# Patient Record
Sex: FEMALE | Race: WHITE
[De-identification: names, ages, dates, MRNs, and addresses within clinical notes are randomized per-mention and may not be internally consistent; named-entity substitution may affect disease eponyms.]

---

## 2017-01-02 ENCOUNTER — HOSPITAL ENCOUNTER (EMERGENCY)
Dept: HOSPITAL 17 - PHEFT | Age: 27
Discharge: HOME | End: 2017-01-02
Payer: MEDICARE

## 2017-01-02 VITALS
SYSTOLIC BLOOD PRESSURE: 133 MMHG | OXYGEN SATURATION: 99 % | RESPIRATION RATE: 18 BRPM | DIASTOLIC BLOOD PRESSURE: 79 MMHG | HEART RATE: 84 BPM

## 2017-01-02 VITALS
DIASTOLIC BLOOD PRESSURE: 105 MMHG | TEMPERATURE: 98.6 F | RESPIRATION RATE: 20 BRPM | HEART RATE: 93 BPM | SYSTOLIC BLOOD PRESSURE: 145 MMHG

## 2017-01-02 VITALS — BODY MASS INDEX: 47.09 KG/M2 | HEIGHT: 66 IN | WEIGHT: 293 LBS

## 2017-01-02 DIAGNOSIS — S23.3XXA: Primary | ICD-10-CM
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
## 2017-01-02 PROCEDURE — 72072 X-RAY EXAM THORAC SPINE 3VWS: CPT

## 2017-01-02 PROCEDURE — 99283 EMERGENCY DEPT VISIT LOW MDM: CPT

## 2017-01-02 PROCEDURE — 84703 CHORIONIC GONADOTROPIN ASSAY: CPT

## 2017-01-02 PROCEDURE — 96372 THER/PROPH/DIAG INJ SC/IM: CPT
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
## 2017-01-02 NOTE — RADHPO
EXAM DATE/TIME:  01/02/2017 14:05 

 

[facility] COMPARISON:     

No previous studies available for comparison.

 

                     

INDICATIONS :     

Upper back pain .

                     

 

MEDICAL HISTORY :     

None.          

 

SURGICAL HISTORY :     

None.   

 

ENCOUNTER:     

Initial                                        

 

ACUITY:     

3 days      

 

PAIN SCORE:     

10/10

 

LOCATION:      

middle thoracic spine

 

FINDINGS:     

There is normal alignment of the thoracic vertebral bodies.  Vertebral body height is maintained.  No
 evidence of fracture or subluxation.  Pedicles are intact at all levels.  The paravertebral reflecti
ons are not thickened. 

 

CONCLUSION:     No acute disease.  

 

 

 

 Cassius Tavares MD on January 02, 2017 at 14:33           

Board Certified Radiologist.

 This report was verified electronically.

## 2017-02-17 ENCOUNTER — HOSPITAL ENCOUNTER (OUTPATIENT)
Dept: HOSPITAL 17 - HRAD | Age: 27
End: 2017-02-17
Payer: MEDICARE

## 2017-02-17 DIAGNOSIS — N13.70: Primary | ICD-10-CM

## 2017-02-17 PROCEDURE — A9541 TC99M SULFUR COLLOID: HCPCS

## 2017-02-17 PROCEDURE — 78740 URETERAL REFLUX STUDY: CPT

## 2017-02-17 NOTE — RADRPT
EXAM DATE/TIME:  02/17/2017 14:07 

 

[facility] COMPARISON:     

No previous studies available for comparison.

 

INDICATIONS :      

***History of right side ureteral reflux and nephrolithiasis.

                       

 

DOSE:      

1.0 mCi Tc99m Sulfur Colloid via urinary catheter  

                       

 

VOLUME:       

A total of 750 cc of saline was instilled into the bladder before onset of voiding.

                       

                       

 

MEDICAL HISTORY :     

Crohn's disease.   Hypothyroid

 

SURGICAL HISTORY :      

Appendectomy.  Cholecystectomy.  Lithiotripsy.

 

ENCOUNTER:     

Initial

 

ACUITY:     

1 day

 

PAIN SCALE:     

6/10

 

LOCATION:      

Right lower quadrant 

 

TECHNIQUE:     

The urinary bladder was catheterized. Radiotracer was placed into the bladder through the catheter.  
Dynamic continuous rapid frame rate acquisition was performed during instillation of saline into the 
bladder and during voiding.  A static posterior planar image of the kidneys was performed postvoid.

 

FINDINGS:     

 

REFLUX:     

No episodes of vesicoureteral reflux are seen on either side during filling or emptying of the bladde
r.

 

POST VOID:     

No activity is seen in the region of the kidneys on the postvoid image.

 

CONCLUSION:     

1. Negative examination.

 

 

 

 Pk Roe MD on February 17, 2017 at 15:15           

Board Certified Radiologist.

 This report was verified electronically.

## 2017-04-23 ENCOUNTER — HOSPITAL ENCOUNTER (EMERGENCY)
Dept: HOSPITAL 17 - PHED | Age: 27
LOS: 1 days | Discharge: HOME | End: 2017-04-24
Payer: MEDICARE

## 2017-04-23 VITALS
DIASTOLIC BLOOD PRESSURE: 71 MMHG | SYSTOLIC BLOOD PRESSURE: 115 MMHG | OXYGEN SATURATION: 98 % | HEART RATE: 68 BPM | RESPIRATION RATE: 14 BRPM

## 2017-04-23 VITALS
HEART RATE: 96 BPM | TEMPERATURE: 98.6 F | RESPIRATION RATE: 12 BRPM | SYSTOLIC BLOOD PRESSURE: 148 MMHG | DIASTOLIC BLOOD PRESSURE: 106 MMHG

## 2017-04-23 VITALS
OXYGEN SATURATION: 96 % | RESPIRATION RATE: 14 BRPM | SYSTOLIC BLOOD PRESSURE: 121 MMHG | HEART RATE: 70 BPM | DIASTOLIC BLOOD PRESSURE: 76 MMHG

## 2017-04-23 VITALS — WEIGHT: 293 LBS | HEIGHT: 66 IN | BODY MASS INDEX: 47.09 KG/M2

## 2017-04-23 VITALS — RESPIRATION RATE: 16 BRPM

## 2017-04-23 VITALS
DIASTOLIC BLOOD PRESSURE: 78 MMHG | SYSTOLIC BLOOD PRESSURE: 137 MMHG | RESPIRATION RATE: 16 BRPM | HEART RATE: 78 BPM | OXYGEN SATURATION: 98 %

## 2017-04-23 DIAGNOSIS — Z87.442: ICD-10-CM

## 2017-04-23 DIAGNOSIS — N23: Primary | ICD-10-CM

## 2017-04-23 DIAGNOSIS — E03.9: ICD-10-CM

## 2017-04-23 DIAGNOSIS — K90.0: ICD-10-CM

## 2017-04-23 DIAGNOSIS — R11.0: ICD-10-CM

## 2017-04-23 DIAGNOSIS — Z87.440: ICD-10-CM

## 2017-04-23 DIAGNOSIS — R31.9: ICD-10-CM

## 2017-04-23 LAB
ANION GAP SERPL CALC-SCNC: 8 MEQ/L (ref 5–15)
BACTERIA #/AREA URNS HPF: (no result) /HPF
BASOPHILS # BLD AUTO: 0 TH/MM3 (ref 0–0.2)
BASOPHILS NFR BLD: 0.5 % (ref 0–2)
BUN SERPL-MCNC: 8 MG/DL (ref 7–18)
CHLORIDE SERPL-SCNC: 107 MEQ/L (ref 98–107)
COLOR UR: YELLOW
COMMENT (UR): (no result)
CULTURE IF INDICATED: (no result)
EOSINOPHIL # BLD: 0.1 TH/MM3 (ref 0–0.4)
EOSINOPHIL NFR BLD: 1.8 % (ref 0–4)
ERYTHROCYTE [DISTWIDTH] IN BLOOD BY AUTOMATED COUNT: 13.4 % (ref 11.6–17.2)
GFR SERPLBLD BASED ON 1.73 SQ M-ARVRAT: 80 ML/MIN (ref 89–?)
GLUCOSE UR STRIP-MCNC: (no result) MG/DL
HCO3 BLD-SCNC: 25.9 MEQ/L (ref 21–32)
HCT VFR BLD CALC: 41.5 % (ref 35–46)
HEMO FLAGS: (no result)
HGB UR QL STRIP: (no result)
KETONES UR STRIP-MCNC: (no result) MG/DL
LYMPHOCYTES # BLD AUTO: 2.2 TH/MM3 (ref 1–4.8)
LYMPHOCYTES NFR BLD AUTO: 29.5 % (ref 9–44)
MCH RBC QN AUTO: 28.4 PG (ref 27–34)
MCHC RBC AUTO-ENTMCNC: 32.9 % (ref 32–36)
MCV RBC AUTO: 86.2 FL (ref 80–100)
MONOCYTES NFR BLD: 5.7 % (ref 0–8)
MUCOUS THREADS #/AREA URNS LPF: (no result) /LPF
NEUTROPHILS # BLD AUTO: 4.7 TH/MM3 (ref 1.8–7.7)
NEUTROPHILS NFR BLD AUTO: 62.5 % (ref 16–70)
NITRITE UR QL STRIP: (no result)
PLATELET # BLD: 201 TH/MM3 (ref 150–450)
POTASSIUM SERPL-SCNC: 3.9 MEQ/L (ref 3.5–5.1)
RBC # BLD AUTO: 4.82 MIL/MM3 (ref 4–5.3)
RBC #/AREA URNS HPF: (no result) /HPF (ref 0–3)
SODIUM SERPL-SCNC: 141 MEQ/L (ref 136–145)
SP GR UR STRIP: 1.03 (ref 1–1.03)
SQUAMOUS #/AREA URNS HPF: (no result) /HPF (ref 0–5)
WBC # BLD AUTO: 7.4 TH/MM3 (ref 4–11)

## 2017-04-23 PROCEDURE — 99284 EMERGENCY DEPT VISIT MOD MDM: CPT

## 2017-04-23 PROCEDURE — 81001 URINALYSIS AUTO W/SCOPE: CPT

## 2017-04-23 PROCEDURE — 80048 BASIC METABOLIC PNL TOTAL CA: CPT

## 2017-04-23 PROCEDURE — 85025 COMPLETE CBC W/AUTO DIFF WBC: CPT

## 2017-04-23 PROCEDURE — 96375 TX/PRO/DX INJ NEW DRUG ADDON: CPT

## 2017-04-23 PROCEDURE — 96374 THER/PROPH/DIAG INJ IV PUSH: CPT

## 2017-04-23 PROCEDURE — 96361 HYDRATE IV INFUSION ADD-ON: CPT

## 2017-04-23 PROCEDURE — 96376 TX/PRO/DX INJ SAME DRUG ADON: CPT

## 2017-04-23 NOTE — PD
HPI


Chief Complaint:  Flank/Kidney Pain


Time Seen by Provider:  21:58


Travel History


International Travel<30 days:  No


Contact w/Intl Traveler<30days:  No


Traveled to known affect area:  No





History of Present Illness


HPI


This 26-year-old female is complaining of right flank and right-sided abdominal 

pain.  She has a history of kidney stones and feels that this is probably a 

kidney stone.  She's been having pain for several days but seems to be getting 

worse.  She has had some nausea.  She has a history of medullary sponge kidney 

and gets frequent kidney stones.  She has incomplete bladder emptying and self 

catheter himself.  He has history of UTIs.





PFSH


Past Medical History


Autoimmune Disease:  Yes (Celiac, TMJ)


Kidney Stones:  Yes


Thyroid Disease:  Yes (Hypo-)





Past Surgical History


Appendectomy:  Yes


Cholecystectomy:  Yes


Oral Surgery:  Yes ([place] teeth )





Social History


Alcohol Use:  Yes (Occ.)


Tobacco Use:  No


Substance Use:  No





Allergies-Medications


(Allergen,Severity, Reaction):  


Coded Allergies:  


     Ambien (Verified  Allergy, Unknown, VOMITING, 3/8/17)


     Imitrex (Verified  Allergy, Unknown, RESP, 3/8/17)


     Lortab (Verified  Allergy, Unknown, HIVES, 3/8/17)


     Sulfa (Verified  Allergy, Unknown, VOMITING, 3/8/17)


Reported Meds & Prescriptions





Reported Meds & Active Scripts


Active


Percocet (Oxycodone-Acetaminophen) 5-325 mg Tab 1-2 Tab PO Q6H PRN


Zofran (Ondansetron HCl) 4 Mg Tab 4 Mg PO Q6HR PRN


Flexeril (Cyclobenzaprine HCl) 10 Mg Tab 10 Mg PO TID PRN


Reported


Trazodone (Trazodone HCl) 50 Mg Tab 50 Mg PO HS


B-12 Compliance Inj (Cyanocobalamin) 1,000 Mcg/Ml Kit 1,000 Mcg IM Q30D


Synthroid (Levothyroxine Sodium) 100 Mcg Tab 100 Mcg PO TID








Review of Systems


General / Constitutional:  No: Fever, Chills


Eyes:  No: Diploplia


HENT:  No: Headaches


Cardiovascular:  No: Chest Pain or Discomfort


Respiratory:  No: Cough, Shortness of Breath


Gastrointestinal:  Positive: Nausea


Genitourinary:  Positive: Hematuria, Flank Pain


Skin:  No Rash, No Itching


Neurologic:  No: Weakness, Dizziness


Endocrine:  No: Heat Intolerance


Hematologic/Lymphatic:  No: Easy Bruising





Physical Exam


Narrative


GENERAL: Well-developed female


SKIN: Focused skin assessment warm/dry.


HEAD: Atraumatic. Normocephalic. 


EYES: Pupils equal and round. No scleral icterus. No injection or drainage. 


ENT: No nasal bleeding or discharge.  Mucous membranes pink and moist.


NECK: Trachea midline. No JVD. 


CARDIOVASCULAR: Regular rate and rhythm.  No murmur appreciated.


RESPIRATORY: No accessory muscle use. Clear to auscultation. Breath sounds 

equal bilaterally. 


GASTROINTESTINAL: Abdomen soft, non-tender, nondistended. Hepatic and splenic 

margins not palpable.  There is right CVA tenderness


MUSCULOSKELETAL: No obvious deformities. No clubbing.  No cyanosis.  No edema. 


NEUROLOGICAL: Awake and alert. No obvious cranial nerve deficits.  Motor 

grossly within normal limits. Normal speech.


PSYCHIATRIC: Appropriate mood and affect; insight and judgment normal.





Data


Data


Last Documented VS





Vital Signs








  Date Time  Temp Pulse Resp B/P Pulse Ox O2 Delivery O2 Flow Rate FiO2


 


4/23/17 23:00   16     


 


4/23/17 21:59  96      


 


4/23/17 21:48 98.6   148/106    








Orders





 Complete Blood Count With Diff (4/23/17 22:03)


Basic Metabolic Panel (Bmp) (4/23/17 22:03)


Urinalysis - C+S If Indicated (4/23/17 22:03)


Sodium Chlor 0.9% 1000 Ml Inj (Ns 1000 M (4/23/17 22:15)


Ondansetron Inj (Zofran Inj) (4/23/17 22:15)


Ketorolac Inj (Toradol Inj) (4/23/17 22:15)


Hydromorphone Pf Inj (Dilaudid Pf Inj) (4/23/17 22:15)


Prochlorperazine Inj (Compazine Inj) (4/23/17 23:00)


Diphenhydramine Inj (Benadryl Inj) (4/23/17 23:00)


Hydromorphone Pf Inj (Dilaudid Pf Inj) (4/23/17 23:00)





Labs








 Laboratory Tests








Test 4/23/17





 22:05


 


White Blood Count 7.4 TH/MM3


 


Red Blood Count 4.82 MIL/MM3


 


Hemoglobin 13.7 GM/DL


 


Hematocrit 41.5 %


 


Mean Corpuscular Volume 86.2 FL


 


Mean Corpuscular Hemoglobin 28.4 PG


 


Mean Corpuscular Hemoglobin 32.9 %





Concent 


 


Red Cell Distribution Width 13.4 %


 


Platelet Count 201 TH/MM3


 


Mean Platelet Volume 9.4 FL


 


Neutrophils (%) (Auto) 62.5 %


 


Lymphocytes (%) (Auto) 29.5 %


 


Monocytes (%) (Auto) 5.7 %


 


Eosinophils (%) (Auto) 1.8 %


 


Basophils (%) (Auto) 0.5 %


 


Neutrophils # (Auto) 4.7 TH/MM3


 


Lymphocytes # (Auto) 2.2 TH/MM3


 


Monocytes # (Auto) 0.4 TH/MM3


 


Eosinophils # (Auto) 0.1 TH/MM3


 


Basophils # (Auto) 0.0 TH/MM3


 


CBC Comment DIFF FINAL 


 


Differential Comment  


 


Urine Color YELLOW 


 


Urine Turbidity SLIGHT 


 


Urine pH 5.5 


 


Urine Specific Gravity 1.028 


 


Urine Protein TRACE mg/dL


 


Urine Glucose (UA) NEG mg/dL


 


Urine Ketones TRACE mg/dL


 


Urine Occult Blood LARGE 


 


Urine Nitrite NEG 


 


Urine Bilirubin NEG 


 


Urine Leukocyte Esterase NEG 


 


Urine RBC INNUM /hpf


 


Urine Squamous Epithelial 0-5 /hpf





Cells 


 


Urine Bacteria FEW /hpf


 


Urine Mucus MOD /lpf


 


Microscopic Urinalysis Comment CULT NOT





 INDICATED


 


Sodium Level 141 MEQ/L


 


Potassium Level 3.9 MEQ/L


 


Chloride Level 107 MEQ/L


 


Carbon Dioxide Level 25.9 MEQ/L


 


Anion Gap 8 MEQ/L


 


Blood Urea Nitrogen 8 MG/DL


 


Creatinine 0.85 MG/DL


 


Estimat Glomerular Filtration 80 ML/MIN





Rate 


 


Random Glucose 100 MG/DL


 


Calcium Level 8.8 MG/DL














MDM


Medical Decision Making


Medical Screen Exam Complete:  Yes


Emergency Medical Condition:  Yes


Medical Record Reviewed:  Yes


Differential Diagnosis


Urine does show hematuria.  This presentation is consistent with renal colic.  

She had a CT scan done 2 weeks ago and has had multiple scans in the past.  I 

don't think she needs imaging now.  He has been given Dilaudid and Compazine 

with improvement.  sHe is stable for discharge


Narrative Course


Patient has been given IV fluids.  She was given Zofran and Dilaudid and had 

persistent pain and nausea so that Dilaudid has been repeated she is also given 

Compazine.  This is helped.





Diagnosis





 Primary Impression:  


 Renal colic on right side


Scripts


Prochlorperazine Maleate 10 Mg Tab10 Mg PO Q6H PRN (NAUSEA OR VOMITING) #20 TAB

  Ref 0


   Prov:James Little MD         4/23/17 


Oxycodone-Acetaminophen (Percocet) mg Tab1 Tab PO Q4H PRN (PAIN) #30 TAB  

Ref 0


   Prov:James Little MD         4/23/17


Disposition:  01 DISCHARGE HOME


Condition:  Stable








James Little MD Apr 23, 2017 22:07

## 2017-04-29 ENCOUNTER — HOSPITAL ENCOUNTER (EMERGENCY)
Dept: HOSPITAL 17 - PHED | Age: 27
LOS: 1 days | Discharge: HOME | End: 2017-04-30
Payer: MEDICARE

## 2017-04-29 VITALS
RESPIRATION RATE: 20 BRPM | SYSTOLIC BLOOD PRESSURE: 148 MMHG | HEART RATE: 79 BPM | OXYGEN SATURATION: 97 % | TEMPERATURE: 98.3 F | DIASTOLIC BLOOD PRESSURE: 91 MMHG

## 2017-04-29 VITALS
HEART RATE: 78 BPM | RESPIRATION RATE: 16 BRPM | OXYGEN SATURATION: 98 % | SYSTOLIC BLOOD PRESSURE: 140 MMHG | DIASTOLIC BLOOD PRESSURE: 93 MMHG

## 2017-04-29 VITALS
DIASTOLIC BLOOD PRESSURE: 78 MMHG | RESPIRATION RATE: 18 BRPM | HEART RATE: 73 BPM | OXYGEN SATURATION: 96 % | SYSTOLIC BLOOD PRESSURE: 148 MMHG

## 2017-04-29 VITALS — HEIGHT: 66 IN | WEIGHT: 293 LBS | BODY MASS INDEX: 47.09 KG/M2

## 2017-04-29 DIAGNOSIS — N20.0: Primary | ICD-10-CM

## 2017-04-29 LAB
ALP SERPL-CCNC: 60 U/L (ref 45–117)
ALT SERPL-CCNC: 18 U/L (ref 10–53)
ANION GAP SERPL CALC-SCNC: 11 MEQ/L (ref 5–15)
APTT BLD: 28.4 SEC (ref 24.3–30.1)
AST SERPL-CCNC: 20 U/L (ref 15–37)
BASOPHILS # BLD AUTO: 0 TH/MM3 (ref 0–0.2)
BASOPHILS NFR BLD: 0.5 % (ref 0–2)
BILIRUB SERPL-MCNC: 0.2 MG/DL (ref 0.2–1)
BUN SERPL-MCNC: 6 MG/DL (ref 7–18)
CHLORIDE SERPL-SCNC: 105 MEQ/L (ref 98–107)
COLOR UR: (no result)
COMMENT (UR): (no result)
CULTURE IF INDICATED: (no result)
EOSINOPHIL # BLD: 0.1 TH/MM3 (ref 0–0.4)
EOSINOPHIL NFR BLD: 2.2 % (ref 0–4)
ERYTHROCYTE [DISTWIDTH] IN BLOOD BY AUTOMATED COUNT: 12.9 % (ref 11.6–17.2)
GFR SERPLBLD BASED ON 1.73 SQ M-ARVRAT: 68 ML/MIN (ref 89–?)
GLUCOSE UR STRIP-MCNC: (no result) MG/DL
HCO3 BLD-SCNC: 25.9 MEQ/L (ref 21–32)
HCT VFR BLD CALC: 39.4 % (ref 35–46)
HEMO FLAGS: (no result)
HGB UR QL STRIP: (no result)
INR PPP: 0.9 RATIO
KETONES UR STRIP-MCNC: (no result) MG/DL
LEUKOCYTE ESTERASE UR QL STRIP: (no result) /HPF (ref 0–5)
LYMPHOCYTES # BLD AUTO: 1.7 TH/MM3 (ref 1–4.8)
LYMPHOCYTES NFR BLD AUTO: 29.3 % (ref 9–44)
MCH RBC QN AUTO: 29.3 PG (ref 27–34)
MCHC RBC AUTO-ENTMCNC: 33.7 % (ref 32–36)
MCV RBC AUTO: 86.8 FL (ref 80–100)
MONOCYTES NFR BLD: 4.6 % (ref 0–8)
NEUTROPHILS # BLD AUTO: 3.5 TH/MM3 (ref 1.8–7.7)
NEUTROPHILS NFR BLD AUTO: 63.4 % (ref 16–70)
NITRITE UR QL STRIP: (no result)
PLATELET # BLD: 179 TH/MM3 (ref 150–450)
POTASSIUM SERPL-SCNC: 4.1 MEQ/L (ref 3.5–5.1)
PROTHROMBIN TIME: 10.2 SEC (ref 9.8–11.6)
RBC # BLD AUTO: 4.54 MIL/MM3 (ref 4–5.3)
RBC #/AREA URNS HPF: (no result) /HPF (ref 0–3)
SODIUM SERPL-SCNC: 142 MEQ/L (ref 136–145)
SP GR UR STRIP: 1.02 (ref 1–1.03)
SQUAMOUS #/AREA URNS HPF: (no result) /HPF (ref 0–5)
WBC # BLD AUTO: 5.6 TH/MM3 (ref 4–11)

## 2017-04-29 PROCEDURE — 96374 THER/PROPH/DIAG INJ IV PUSH: CPT

## 2017-04-29 PROCEDURE — 85610 PROTHROMBIN TIME: CPT

## 2017-04-29 PROCEDURE — 96375 TX/PRO/DX INJ NEW DRUG ADDON: CPT

## 2017-04-29 PROCEDURE — 99284 EMERGENCY DEPT VISIT MOD MDM: CPT

## 2017-04-29 PROCEDURE — 85730 THROMBOPLASTIN TIME PARTIAL: CPT

## 2017-04-29 PROCEDURE — 80053 COMPREHEN METABOLIC PANEL: CPT

## 2017-04-29 PROCEDURE — 85025 COMPLETE CBC W/AUTO DIFF WBC: CPT

## 2017-04-29 PROCEDURE — 81001 URINALYSIS AUTO W/SCOPE: CPT

## 2017-04-29 PROCEDURE — 96361 HYDRATE IV INFUSION ADD-ON: CPT

## 2017-04-29 PROCEDURE — 74176 CT ABD & PELVIS W/O CONTRAST: CPT

## 2017-04-29 NOTE — RADHPO
EXAM DATE/TIME:  04/29/2017 22:42 

 

[facility] COMPARISON:     

No previous studies available for comparison.

 

 

INDICATIONS :     

Right sided flank pain for two weeks. 

                  

 

ORAL CONTRAST:      

No oral contrast ingested.

                  

 

RADIATION DOSE:     

28.12 CTDIvol (mGy) 

 

 

MEDICAL HISTORY :     

Renal calculi.  

 

SURGICAL HISTORY :      

Appendectomy. Cholecystectomy.Lithotripsy. 

 

ENCOUNTER:      

Initial

 

ACUITY:      

2 weeks

 

PAIN SCALE:      

7/10

 

LOCATION:       

Right flank 

 

TECHNIQUE:     

Volumetric scanning of the abdomen and pelvis was performed.  Using automated exposure control and ad
justment of the mA and/or kV according to patient size, radiation dose was kept as low as reasonably 
achievable to obtain optimal diagnostic quality images. 

 

FINDINGS:  

There are nonobstructing stones of the right kidney, 3 mm of the upper pole, 3 mm of the mid zone and
 5 and 4 mm of the lower pole. No left renal calculus. No ureteral stone on either side. No hydroneph
rosis or hydroureter. 

 

Noncontrast appearance of the liver, spleen, pancreas and adrenal glands within normal limits. No obs
truction or inflammatory changes are seen of the gastrointestinal tract.

 

Patient has had previous cholecystectomy and appendectomy. There is a dropped cholecystectomy clip in
 the right posterior subdiaphragmatic space.

 

No free fluid. No lymphadenopathy. No free air. Visualized lung bases are clear. Visualized osseous s
tructures are intact and without focal acute abnormality.

 

CONCLUSION:     

Several 3-5 mm nonobstructing stones of the right kidney.

 

 

 

 Panfilo Cuevas MD on April 29, 2017 at 23:01           

Board Certified Radiologist.

 This report was verified electronically.

## 2017-04-29 NOTE — PD
HPI


Chief Complaint:  Flank/Kidney Pain


Time Seen by Provider:  22:03


Travel History


International Travel<30 days:  No


Contact w/Intl Traveler<30days:  No


Traveled to known affect area:  No





History of Present Illness


HPI


26-year-old female complains of right flank pain.  Patient states the pain 

started 2 weeks ago.  Patient has history of recurrent right flank pain has 

been seen by urologist locally, Dr. Lay.  Patient has been taking Percocet 

at home without much relief of the pain.  Patient states that she has limited 

nausea vomiting also.  Patient states the pain cramping pain and sharp pain 

started the right flank area with radiation to right side abdomen.  Patient 

denies any fever chills.  Patient has history of kidney stone, incomplete 

bladder emptying, medullary sponge kidney.  Patient self catheter herself.  

Patient also has history of von Willebrand disease, celiac disease, Hashimoto's 

thyroiditis, anemia secondary to B12 deficiency, menorrhagia.  She was seen by 

Dr. Lay recently and had a VCUR which did not show any reflux.  IVP show 

medullary calcinosis of the right kidney.  Patient was referred to [facility] 

for follow-up with her persistent symptoms of right flank pain.





PFSH


Past Medical History


Autoimmune Disease:  Yes (Celiac, TMJ)


Diminished Hearing:  No


Genitourinary:  Yes (KIDNEY REFLUX, MULTIPLE STONES)


Kidney Stones:  Yes


Thyroid Disease:  Yes (Hypo-)


Pregnant?:  Not Pregnant


LMP:  IUD/8 months





Past Surgical History


Appendectomy:  Yes


Cholecystectomy:  Yes


Oral Surgery:  Yes ([place] teeth )





Social History


Alcohol Use:  Yes (Occ.)


Tobacco Use:  No


Substance Use:  No





Allergies-Medications


(Allergen,Severity, Reaction):  


Coded Allergies:  


     Ambien (Verified  Allergy, Unknown, VOMITING, 4/29/17)


     Imitrex (Verified  Allergy, Unknown, RESP, 4/29/17)


     Lortab (Verified  Allergy, Unknown, HIVES, 4/29/17)


     Sulfa (Verified  Allergy, Unknown, VOMITING, 4/29/17)


Reported Meds & Prescriptions





Reported Meds & Active Scripts


Active


Phenergan (Promethazine HCl) 25 Mg Tab 25 Mg PO Q6H PRN


Zofran Odt (Ondansetron Odt) 4 Mg Tab 4 Mg SL Q6HR PRN


Flexeril (Cyclobenzaprine HCl) 10 Mg Tab 10 Mg PO TID


Percocet (Oxycodone-Acetaminophen)  mg Tab 1 Tab PO Q4H PRN


Flexeril (Cyclobenzaprine HCl) 10 Mg Tab 10 Mg PO TID PRN


Reported


Doxycycline (Doxycycline (Monohydrate)) 100 Mg Cap   BID


Flomax (Tamsulosin HCl) 0.4 Mg Cap 0.4 Mg PO HS


Flomax (Tamsulosin HCl) 0.4 Mg Cap 0.4 Mg PO HS


Prochlorperazine Maleate 10 Mg Tab 10 Mg PO Q4H PRN


B-12 Compliance Inj (Cyanocobalamin) 1,000 Mcg/Ml Kit 1,000 Mcg IM Q30D


Synthroid (Levothyroxine Sodium) 100 Mcg Tab 100 Mcg PO TID








Review of Systems


General / Constitutional:  No: Fever


Eyes:  No: Visual changes


HENT:  No: Headaches


Cardiovascular:  No: Chest Pain or Discomfort


Respiratory:  No: Shortness of Breath


Gastrointestinal:  No: Abdominal Pain


Genitourinary:  No: Dysuria


Musculoskeletal:  No: Pain


Skin:  No Rash


Neurologic:  No: Weakness


Psychiatric:  No: Depression


Endocrine:  No: Polydipsia


Hematologic/Lymphatic:  No: Easy Bruising





Physical Exam


Narrative


GENERAL: Well-nourished, well-developed patient.


SKIN: Focused skin assessment warm/dry.


HEAD: Normocephalic.


EYES: No scleral icterus. No injection or drainage. 


NECK: Supple, trachea midline. No JVD or lymphadenopathy.


CARDIOVASCULAR: Regular rate and rhythm without murmurs, gallops, or rubs. 


RESPIRATORY: Breath sounds equal bilaterally. No accessory muscle use.


GASTROINTESTINAL: Abdomen soft,  nondistended.  Patient has mild tenderness on 

palpation right flank area.  No rebound tenderness.  No mass.


MUSCULOSKELETAL: No cyanosis, or edema. 


BACK: Patient has moderate tenderness on palpation right flank area.


Neurologic exam normal.





Data


Data


Last Documented VS





Vital Signs








  Date Time  Temp Pulse Resp B/P Pulse Ox O2 Delivery O2 Flow Rate FiO2


 


4/29/17 23:08  78 16 140/93 98 Room Air  


 


4/29/17 21:59 98.3       








Orders





 Complete Blood Count With Diff (4/29/17 22:16)


Comprehensive Metabolic Panel (4/29/17 22:16)


Prothrombin Time / Inr (Pt) (4/29/17 22:16)


Act Partial Throm Time (Ptt) (4/29/17 22:16)


Urinalysis - C+S If Indicated (4/29/17 22:16)


Ct Abd/Pel W/O Iv Contrast (4/29/17 22:16)


Iv Access Insert/Monitor (4/29/17 22:16)


Ecg Monitoring (4/29/17 22:16)


Oximetry (4/29/17 22:16)


Ondansetron Inj (Zofran Inj) (4/29/17 22:30)


Sodium Chlor 0.9% 1000 Ml Inj (Ns 1000 M (4/29/17 22:16)


Hydromorphone Pf Inj (Dilaudid Pf Inj) (4/29/17 22:30)


Metoclopramide Inj (Reglan Inj) (4/29/17 23:30)


Diphenhydramine Inj (Benadryl Inj) (4/29/17 23:30)





Labs








 Laboratory Tests








Test 4/29/17





 22:45


 


White Blood Count 5.6 TH/MM3


 


Red Blood Count 4.54 MIL/MM3


 


Hemoglobin 13.3 GM/DL


 


Hematocrit 39.4 %


 


Mean Corpuscular Volume 86.8 FL


 


Mean Corpuscular Hemoglobin 29.3 PG


 


Mean Corpuscular Hemoglobin 33.7 %





Concent 


 


Red Cell Distribution Width 12.9 %


 


Platelet Count 179 TH/MM3


 


Mean Platelet Volume 9.7 FL


 


Neutrophils (%) (Auto) 63.4 %


 


Lymphocytes (%) (Auto) 29.3 %


 


Monocytes (%) (Auto) 4.6 %


 


Eosinophils (%) (Auto) 2.2 %


 


Basophils (%) (Auto) 0.5 %


 


Neutrophils # (Auto) 3.5 TH/MM3


 


Lymphocytes # (Auto) 1.7 TH/MM3


 


Monocytes # (Auto) 0.3 TH/MM3


 


Eosinophils # (Auto) 0.1 TH/MM3


 


Basophils # (Auto) 0.0 TH/MM3


 


CBC Comment DIFF FINAL 


 


Differential Comment  


 


Prothrombin Time 10.2 SEC


 


Prothromb Time International 0.9 RATIO





Ratio 


 


Activated Partial 28.4 SEC





Thromboplast Time 


 


Urine Color RED 


 


Urine Turbidity CLOUDY 


 


Urine pH 6.0 


 


Urine Specific Gravity 1.017 


 


Urine Protein TRACE mg/dL


 


Urine Glucose (UA) NEG mg/dL


 


Urine Ketones NEG mg/dL


 


Urine Occult Blood LARGE 


 


Urine Nitrite NEG 


 


Urine Bilirubin NEG 


 


Urine Leukocyte Esterase NEG 


 


Urine RBC INNUM /hpf


 


Urine WBC 3-5 /hpf


 


Urine Squamous Epithelial 0-5 /hpf





Cells 


 


Microscopic Urinalysis Comment CULT NOT





 INDICATED


 


Sodium Level 142 MEQ/L


 


Potassium Level 4.1 MEQ/L


 


Chloride Level 105 MEQ/L


 


Carbon Dioxide Level 25.9 MEQ/L


 


Anion Gap 11 MEQ/L


 


Blood Urea Nitrogen 6 MG/DL


 


Creatinine 0.98 MG/DL


 


Estimat Glomerular Filtration 68 ML/MIN





Rate 


 


Random Glucose 100 MG/DL


 


Calcium Level 8.5 MG/DL


 


Total Bilirubin 0.2 MG/DL


 


Aspartate Amino Transf 20 U/L





(AST/SGOT) 


 


Alanine Aminotransferase 18 U/L





(ALT/SGPT) 


 


Alkaline Phosphatase 60 U/L


 


Total Protein 6.9 GM/DL


 


Albumin 3.5 GM/DL














[facility]


Medical Decision Making


Medical Screen Exam Complete:  Yes


Emergency Medical Condition:  Yes


Medical Record Reviewed:  Yes


Interpretation(s)





Last Impressions








Abdomen/Pelvis CT 4/29/17 2216 Signed





Impressions: 





 Service Date/Time:  Saturday, April 29, 2017 22:42 - CONCLUSION:  Several 3-5 

mm 





 nonobstructing stones of the right kidney.     Panfilo Cuevas MD 





23:46 PM.  CBC within normal limit.  CMP within normal limit.  UA positive for 

RBC.


Differential Diagnosis


Differential diagnosis including acute and chronic flank pain, nephrolithiasis, 

pyelonephritis, colitis.


Narrative Course


26-year-old female with persistent right flank pain.  History of kidney stone 

and medullary sponge kidney and possible neurogenic bladder.  Normal saline 

solution 125 cc an hour.  Dilaudid 1 mg IV.  Zofran 4 mg IV.  Reglan 10 mg IV.  

Benadryl 25 mg IV.  CT scan abdomen and pelvis showed nonobstructive stones 

right kidney.





Diagnosis





 Primary Impression:  


 Nephrolithiasis


Patient Instructions:  General Instructions





***Additional Instructions:


Flexeril as needed for pain.  Follow-up with urologist and personal physician.  

Return if worse.


***Med/Other Pt SpecificInfo:  Prescription(s) given


Scripts


Promethazine (Phenergan)25 Mg Tab25 Mg PO Q6H PRN (Nausea/Vomiting) #20 TAB  

Ref 0


   Prov:Olivier Knutson MD         4/29/17 


Ondansetron Odt (Zofran Odt)4 Mg Tab4 Mg SL Q6HR PRN (Nausea/Vomiting) #20 TAB  

Ref 0


   Prov:Olivier Knutson MD         4/29/17 


Cyclobenzaprine (Flexeril)10 Mg Tab10 Mg PO TID  #60 TAB  Ref 0


   Prov:Olivier Knutson MD         4/29/17


Disposition:  01 DISCHARGE HOME


Condition:  Stable








Olivier Knutson MD Apr 29, 2017 22:30

## 2017-06-30 ENCOUNTER — HOSPITAL ENCOUNTER (EMERGENCY)
Dept: HOSPITAL 17 - PHED | Age: 27
Discharge: HOME | End: 2017-06-30
Payer: MEDICARE

## 2017-06-30 VITALS
DIASTOLIC BLOOD PRESSURE: 96 MMHG | OXYGEN SATURATION: 100 % | SYSTOLIC BLOOD PRESSURE: 157 MMHG | TEMPERATURE: 98.2 F | RESPIRATION RATE: 16 BRPM | HEART RATE: 94 BPM

## 2017-06-30 VITALS
RESPIRATION RATE: 16 BRPM | OXYGEN SATURATION: 96 % | HEART RATE: 78 BPM | SYSTOLIC BLOOD PRESSURE: 140 MMHG | DIASTOLIC BLOOD PRESSURE: 78 MMHG

## 2017-06-30 VITALS — HEIGHT: 66 IN | BODY MASS INDEX: 47.09 KG/M2 | WEIGHT: 293 LBS

## 2017-06-30 DIAGNOSIS — G43.909: Primary | ICD-10-CM

## 2017-06-30 DIAGNOSIS — E03.9: ICD-10-CM

## 2017-06-30 DIAGNOSIS — K90.0: ICD-10-CM

## 2017-06-30 DIAGNOSIS — D68.0: ICD-10-CM

## 2017-06-30 PROCEDURE — 99284 EMERGENCY DEPT VISIT MOD MDM: CPT

## 2017-06-30 PROCEDURE — 96375 TX/PRO/DX INJ NEW DRUG ADDON: CPT

## 2017-06-30 PROCEDURE — 96374 THER/PROPH/DIAG INJ IV PUSH: CPT

## 2017-06-30 NOTE — PD
HPI


Chief Complaint:  Headache


Time Seen by Provider:  14:12


Travel History


International Travel<30 days:  No


Contact w/Intl Traveler<30days:  No


Traveled to known affect area:  No





History of Present Illness


HPI


26yo F with PMH of migraine, von willebrands, celiac disease, hypothyroidism 

presents to the ED with c/o right sided headache for 5 days.  States it feels 

like her usual migraine headache.  Pain is throbbing, constant and associated 

with photophobia, phonophobia and nausea.  States usually gets migraine 

headache once or twice a month and illness and storm can trigger it.  Pt just 

finished antibiotics for UTI.  Denies any fever, trauma, chest pain, sob, 

vomiting, abdominal pain, focal weakness or numbness.





PFSH


Past Medical History


Autoimmune Disease:  Yes (Celiac, TMJ)


Depression:  Yes


Diminished Hearing:  No


Genitourinary:  Yes (KIDNEY REFLUX, MULTIPLE STONES; incomplete bladder emptying

)


Headaches:  Yes (migraomes)


Kidney Stones:  Yes


Thyroid Disease:  Yes (Hypo-)


Influenza Vaccination:  No


Pregnant?:  Not Pregnant


LMP:  IUD





Past Surgical History


Appendectomy:  Yes


Cholecystectomy:  Yes


Oral Surgery:  Yes ([place] teeth )





Social History


Alcohol Use:  Yes (Occ.)


Tobacco Use:  No


Substance Use:  No





Allergies-Medications


(Allergen,Severity, Reaction):  


Coded Allergies:  


     Ambien (Verified  Allergy, Unknown, VOMITING, 6/30/17)


     Imitrex (Verified  Allergy, Unknown, RESP, 6/30/17)


     Lortab (Verified  Allergy, Unknown, HIVES, 6/30/17)


     Sulfa (Verified  Allergy, Unknown, VOMITING, 6/30/17)


Reported Meds & Prescriptions





Reported Meds & Active Scripts


Active


Zofran Liq (Ondansetron HCl) 4 Mg/5 Ml Soln 4 Mg PO Q6HR


Allopurinol 300 Mg Tab 300 Mg PO DAILY


Flexeril (Cyclobenzaprine HCl) 10 Mg Tab 10 Mg PO TID PRN


Reported


Buspirone (Buspirone HCl) 5 Mg Tab 5 Mg PO BID


B-12 Compliance Inj (Cyanocobalamin) 1,000 Mcg/Ml Kit 1,000 Mcg IM Q30D


Synthroid (Levothyroxine Sodium) 100 Mcg Tab 100 Mcg PO TID








Review of Systems


Except as stated in HPI:  all other systems reviewed are Neg





Physical Exam


Narrative


GENERAL: 26yo F in mild distress.


SKIN: Focused skin assessment warm/dry.


HEAD: Atraumatic. Normocephalic. 


EYES: Pupils equal and round at 4mm bilaterally.  EOMI. No scleral icterus. No 

injection or drainage. 


ENT: No nasal bleeding or discharge.  Mucous membranes pink and moist.


NECK: No nuchal rigidity.


CARDIOVASCULAR: Regular rate and rhythm.  No murmur appreciated.


RESPIRATORY: No accessory muscle use. Clear to auscultation. Breath sounds 

equal bilaterally. 


GASTROINTESTINAL: Abdomen soft, non-tender, nondistended. No rebound tenderness 

or guarding.


MUSCULOSKELETAL: No obvious deformities. No clubbing.  No cyanosis.  No edema. 


NEUROLOGICAL: Awake and alert. No obvious cranial nerve deficits.  Motor 

grossly within normal limits. Normal speech.


PSYCHIATRIC: Appropriate mood and affect; insight and judgment normal.





Data


Data


Last Documented VS





Vital Signs








  Date Time  Temp Pulse Resp B/P Pulse Ox O2 Delivery O2 Flow Rate FiO2


 


6/30/17 15:09  78 16 140/78 96 Room Air  


 


6/30/17 14:04 98.2       








Orders





 Ketorolac Inj (Toradol Inj) (6/30/17 14:30)


Diphenhydramine Inj (Benadryl Inj) (6/30/17 14:30)


Prochlorperazine Inj (Compazine Inj) (6/30/17 14:30)








MDM


Medical Decision Making


Medical Screen Exam Complete:  Yes


Emergency Medical Condition:  Yes


Differential Diagnosis


Migraine headache vs. sinus headache vs. tension headache


Narrative Course


26yo F with history of migraine headaches here with what sounds like her usual 

headache.  No red flags.  No focal neurologic deficits on exam.  Pt given 

compazine, toradol and diphenhydramine.  Pt reevaluated at bedside and feels 

better.  Headache resolved and has appointment to follow up with her 

neurologist.  Return precautions given.





Diagnosis





 Primary Impression:  


 Migraine headache


 Qualified Code:  G43.909 - Migraine without status migrainosus, not intractable

, unspecified migraine type


Patient Instructions:  General Instructions


Departure Forms:  Tests/Procedures





***Additional Instructions:


Please follow up with your neurologist at your next appointment.  Return to the 

ED if symptoms worsen.


***Med/Other Pt SpecificInfo:  Prescription(s) given


Scripts


Acetaminophen (Tylenol)325 Mg [id number] Mg PO Q6H PRN (PAIN SCALE 1 TO 4) #20 TAB  

Ref 0


   Prov:Margarita Webber DO         6/30/17


Disposition:  01 DISCHARGE HOME


Condition:  Stable








Margarita Webber DO Jun 30, 2017 14:23

## 2017-08-11 ENCOUNTER — HOSPITAL ENCOUNTER (EMERGENCY)
Dept: HOSPITAL 17 - PHEFT | Age: 27
Discharge: HOME | End: 2017-08-11
Payer: MEDICARE

## 2017-08-11 VITALS
DIASTOLIC BLOOD PRESSURE: 84 MMHG | OXYGEN SATURATION: 96 % | RESPIRATION RATE: 18 BRPM | HEART RATE: 79 BPM | TEMPERATURE: 98.7 F | SYSTOLIC BLOOD PRESSURE: 140 MMHG

## 2017-08-11 VITALS — WEIGHT: 293 LBS | BODY MASS INDEX: 47.09 KG/M2 | HEIGHT: 66 IN

## 2017-08-11 DIAGNOSIS — M23.91: Primary | ICD-10-CM

## 2017-08-11 PROCEDURE — 99283 EMERGENCY DEPT VISIT LOW MDM: CPT

## 2017-08-11 PROCEDURE — E0113 CRUTCH UNDERARM EACH WOOD: HCPCS

## 2017-08-11 PROCEDURE — 73564 X-RAY EXAM KNEE 4 OR MORE: CPT

## 2017-08-11 NOTE — PD
HPI


Chief Complaint:  Pain: Acute or Chronic


Time Seen by Provider:  12:00


Travel History


International Travel<30 days:  No


Contact w/Intl Traveler<30days:  No


Traveled to known affect area:  No





History of Present Illness


HPI


27-year-old female presents to the emergency room for evaluation of right knee 

pain and swelling for the past 5 days after her knee buckled while walking.  

She did not actually fall.  Patient has history of lateral meniscal repair a 

year ago in the same knee as well as growth plate fusion and tumor removal of 

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  States she has chronic bilateral lower extremity tremors 

which causes her knees to give out occasionally.  Since the repair last year, 

it has given out more often than before.  She has been keeping it elevated and 

iced but the swelling has persisted.  She has been able to bear weight reports 

significant pain especially with straightening the knee all the way.  States it 

feels like it locks in place.  Reports occasional third, fourth, and fifth toe 

paresthesias with ambulation.  Patient has history of von Willebrand's disease 

and cannot take NSAIDs but took Advil without significant relief in symptoms.  

She has also been taking Tylenol which does not seem to help.  She called her 

primary care physician and she has an appointment to get a referral in eyes.  

She also called her orthopedic surgeon but they told her they could not get her 

in for 3-4 weeks.





PFSH


Past Medical History


Autoimmune Disease:  Yes (Celiac, TMJ)


Depression:  Yes


Diminished Hearing:  No


Genitourinary:  Yes (KIDNEY REFLUX, MULTIPLE STONES; incomplete bladder emptying

)


Headaches:  Yes (migraomes)


Kidney Stones:  Yes


Thyroid Disease:  Yes (Hypo-)





Past Surgical History


Appendectomy:  Yes


Cholecystectomy:  Yes


Oral Surgery:  Yes ([place] teeth )





Social History


Alcohol Use:  Yes (Occ.)


Tobacco Use:  No


Substance Use:  No





Allergies-Medications


(Allergen,Severity, Reaction):  


Coded Allergies:  


     Ambien (Verified  Allergy, Unknown, VOMITING, 8/11/17)


     Imitrex (Verified  Allergy, Unknown, RESP, 8/11/17)


     Lortab (Verified  Allergy, Unknown, HIVES, 8/11/17)


     Sulfa (Verified  Allergy, Unknown, VOMITING, 8/11/17)


Reported Meds & Prescriptions





Reported Meds & Active Scripts


Active


Allopurinol 300 Mg Tab 300 Mg PO DAILY


Reported


Synthroid (Levothyroxine Sodium) 200 Mcg Tab 200 Mcg PO 1X WK


Synthroid (Levothyroxine Sodium) 300 Mcg Tab 300 Mcg PO 6X WK


B-12 Compliance Inj (Cyanocobalamin) 1,000 Mcg/Ml Kit 1,000 Mcg IM Q30D








Review of Systems


Except as stated in HPI:  all other systems reviewed are Neg





Physical Exam


Narrative


GENERAL: Well-nourished, well-developed female in no acute distress.  Afebrile.

  Ambulatory with one crutch.


SKIN: Focused skin assessment warm/dry.  No erythema.  Very minimal ecchymosis 

over the medial knee.


HEAD: Normocephalic.


EYES: No scleral icterus. No injection or drainage. 


NECK: Supple, trachea midline. No JVD or lymphadenopathy.


CARDIOVASCULAR: Regular rate and rhythm without murmurs, gallops, or rubs. 


RESPIRATORY: Breath sounds equal bilaterally. No accessory muscle use.


MUSCULOSKELETAL: No cyanosis.  Moderate edema of the right knee.  No obvious 

effusion.  2+ dorsalis pedis pulse.  Full range of motion of the ankle and 

foot.  Distal sensation intact.  No ankle pain.  No bony tenderness to 

palpation of the knee.  Pain with varus stress.  Negative anterior and 

posterior draw tests.  Full flexion and limited extension of the right knee 

secondary to pain.





Data


Data


Last Documented VS








Vital Signs








  Date Time  Temp Pulse Resp B/P Pulse Ox O2 Delivery O2 Flow Rate FiO2


 


8/11/17 12:36 98.7 79 18 140/84 96 Room Air  











Orders





 Knee, Complete (4vws) (8/11/17 )








[facility]


Medical Decision Making


Medical Screen Exam Complete:  Yes


Emergency Medical Condition:  Yes


Medical Record Reviewed:  Yes


Differential Diagnosis


Internal derangement, sprain, strain, fracture


Narrative Course


27-year-old female presents to the emergency room for evaluation of right knee 

pain and swelling for the past 5 days.  Patient's knee buckled and since then 

she has had medial knee pain.  She did not actually fall.  Physical exam 

reveals mild ecchymosis over the medial aspect of the right knee.  Moderate 

edema of the right knee.  No obvious effusion.  2+ dorsalis pedis pulse.  Full 

flexion and limited extension of the right knee secondary to pain.  Distal 

sensation intact. No bony tenderness to palpation of the knee.  Pain with varus 

stress.  X-ray shows no acute bony abnormality.  Given patient's history, this 

is likely internal derangement, possible medial meniscal tear.  Patient was 

placed in Ace wrap for support and discharged with crutches.  She was 

encouraged to follow up with her primary care physician as planned on Tuesday 

for outpatient MRI and referral to orthopedic surgeon.  She understands and 

agrees to plan.





Diagnosis





 Primary Impression:  


 Internal derangement of knee


 Qualified Code:  M23.91 - Internal derangement of right knee


Referrals:  


Primary Care Physician


Patient Instructions:  General Instructions, Knee Sprain (ED)





***Additional Instructions:


Rest and drink plenty of fluids.


Take Tylenol as directed, as needed for pain.


Continue elevating and applying ice to the affected area for 20 minutes at a 

time, as needed for pain and swelling.


Follow-up with a primary care physician for outpatient MRI.


Return to the emergency room for worsening symptoms.


***Med/Other Pt SpecificInfo:  Prescription(s) given


Disposition:  01 DISCHARGE HOME


Condition:  Stable








Ailyn Franks Aug 11, 2017 12:28

## 2017-08-11 NOTE — RADRPT
EXAM DATE/TIME:  08/11/2017 13:18 

 

[facility] COMPARISON:     

No previous studies available for comparison.

 

                     

INDICATIONS :     

Right knee pain for 1 week with no trauma.

                     

 

MEDICAL HISTORY :     

None.       prior dx fused growth plates   

 

SURGICAL HISTORY :        

prior meniscus surgery

 

ENCOUNTER:     

Initial                                        

 

ACUITY:     

2 days      

 

PAIN SCORE:     

5/10

 

LOCATION:     

Right  knee medial and lateral

 

FINDINGS:     

Four view examination of the right knee demonstrates no evidence of fracture or dislocation.  Bony mi
neralization is normal.  The articular surfaces are intact.  The suprapatellar soft tissues have a no
rmal configuration.

 

CONCLUSION:     

No acute disease or significant arthropathy..  

 

 

 

 Obdulio Carlisle MD on August 11, 2017 at 14:18           

Board Certified Radiologist.

 This report was verified electronically.

## 2017-08-24 ENCOUNTER — HOSPITAL ENCOUNTER (OUTPATIENT)
Dept: HOSPITAL 17 - HSDC | Age: 27
Discharge: HOME | End: 2017-08-24
Payer: MEDICARE

## 2017-08-24 VITALS
RESPIRATION RATE: 20 BRPM | DIASTOLIC BLOOD PRESSURE: 85 MMHG | OXYGEN SATURATION: 97 % | HEART RATE: 65 BPM | SYSTOLIC BLOOD PRESSURE: 110 MMHG | TEMPERATURE: 98 F

## 2017-08-24 VITALS — BODY MASS INDEX: 47.09 KG/M2 | HEIGHT: 66 IN | WEIGHT: 293 LBS

## 2017-08-24 DIAGNOSIS — R33.9: ICD-10-CM

## 2017-08-24 DIAGNOSIS — K58.9: ICD-10-CM

## 2017-08-24 DIAGNOSIS — E03.9: ICD-10-CM

## 2017-08-24 DIAGNOSIS — N31.2: ICD-10-CM

## 2017-08-24 DIAGNOSIS — N35.9: Primary | ICD-10-CM

## 2017-08-24 DIAGNOSIS — M19.90: ICD-10-CM

## 2017-08-24 DIAGNOSIS — Z01.818: ICD-10-CM

## 2017-08-24 LAB
BASOPHILS # BLD AUTO: 0 TH/MM3 (ref 0–0.2)
BASOPHILS NFR BLD: 0.4 % (ref 0–2)
EOSINOPHIL # BLD: 0.1 TH/MM3 (ref 0–0.4)
EOSINOPHIL NFR BLD: 1.4 % (ref 0–4)
ERYTHROCYTE [DISTWIDTH] IN BLOOD BY AUTOMATED COUNT: 13.7 % (ref 11.6–17.2)
HCT VFR BLD CALC: 42.6 % (ref 35–46)
HEMO FLAGS: (no result)
LYMPHOCYTES # BLD AUTO: 1.7 TH/MM3 (ref 1–4.8)
LYMPHOCYTES NFR BLD AUTO: 22 % (ref 9–44)
MCH RBC QN AUTO: 29 PG (ref 27–34)
MCHC RBC AUTO-ENTMCNC: 32.5 % (ref 32–36)
MCV RBC AUTO: 89.2 FL (ref 80–100)
MONOCYTES NFR BLD: 6.4 % (ref 0–8)
NEUTROPHILS # BLD AUTO: 5.2 TH/MM3 (ref 1.8–7.7)
NEUTROPHILS NFR BLD AUTO: 69.8 % (ref 16–70)
PLATELET # BLD: 206 TH/MM3 (ref 150–450)
RBC # BLD AUTO: 4.77 MIL/MM3 (ref 4–5.3)
WBC # BLD AUTO: 7.5 TH/MM3 (ref 4–11)

## 2017-08-24 PROCEDURE — 85025 COMPLETE CBC W/AUTO DIFF WBC: CPT

## 2017-08-24 PROCEDURE — 00910 ANES TRANSURETHRAL PX NOS: CPT

## 2017-08-24 PROCEDURE — 52281 CYSTOSCOPY AND TREATMENT: CPT

## 2017-08-24 NOTE — PD.OP
__________________________________________________





Operative Report


Date of Surgery:  Aug 24, 2017


Preoperative Diagnosis:  


Atonic neurogenic bladder with meatal stenosis


Postoperative Diagnosis:  


Same


Procedure:


Cystoscopy with urethral dilatation


Anesthesia:


Gen.


Surgeon:


Fernando Lay


Assistant(s):


None


Resident Surgeon:


none


Operation and Findings:


27-year-old female with history of atonic neurogenic bladder who performs clean 

intermittent catheterization.  Patient has been having difficulty inserting her 

Gauthier catheter due to meatal stenosis and elected to go to the operating room 

to undergo cystoscopy with urethral dilatation.  Risk and benefits were 

discussed and she was willing to proceed.  Patient was brought to the operating 

room and identified by myself as Shauna Dawson.  She's placed in the dorsal 

lithotomy position, prepped and draped in usual sterile fashion, received 

preprocedure antibiotics, and general anesthesia was administered.  22 Jordanian 

cystoscope was inserted and the bladder pan cystoscopy did not reveal any 

abnormalities.  Both ureteral orifices were identified and effluxed was 

identified bilaterally.  At this time, decision was made then to perform 

urethral dilatation.  Using a 24 Jordanian female sound the urethral was dilated 

up to a 36 Jordanian sound.  She tolerated the procedure well and there no 

complications.  She was extubated and transferred to her room in stable 

condition.











Fernando Lay DO Aug 24, 2017 11:09

## 2017-10-27 ENCOUNTER — HOSPITAL ENCOUNTER (OUTPATIENT)
Dept: HOSPITAL 17 - CPRE | Age: 27
End: 2017-10-27
Payer: MEDICARE

## 2017-10-27 DIAGNOSIS — Z01.812: Primary | ICD-10-CM

## 2017-10-27 DIAGNOSIS — N31.2: ICD-10-CM

## 2017-10-27 LAB
BASOPHILS # BLD AUTO: 0 TH/MM3 (ref 0–0.2)
BASOPHILS NFR BLD: 0.3 % (ref 0–2)
EOSINOPHIL # BLD: 0.1 TH/MM3 (ref 0–0.4)
EOSINOPHIL NFR BLD: 1.7 % (ref 0–4)
ERYTHROCYTE [DISTWIDTH] IN BLOOD BY AUTOMATED COUNT: 14.4 % (ref 11.6–17.2)
HCT VFR BLD CALC: 40.6 % (ref 35–46)
HGB BLD-MCNC: 13.5 GM/DL (ref 11.6–15.3)
LYMPHOCYTES # BLD AUTO: 1.8 TH/MM3 (ref 1–4.8)
LYMPHOCYTES NFR BLD AUTO: 28.7 % (ref 9–44)
MCH RBC QN AUTO: 29.9 PG (ref 27–34)
MCHC RBC AUTO-ENTMCNC: 33.2 % (ref 32–36)
MCV RBC AUTO: 90.1 FL (ref 80–100)
MONOCYTE #: 0.4 TH/MM3 (ref 0–0.9)
MONOCYTES NFR BLD: 5.8 % (ref 0–8)
NEUTROPHILS # BLD AUTO: 4 TH/MM3 (ref 1.8–7.7)
NEUTROPHILS NFR BLD AUTO: 63.5 % (ref 16–70)
PLATELET # BLD: 180 TH/MM3 (ref 150–450)
PMV BLD AUTO: 8 FL (ref 7–11)
RBC # BLD AUTO: 4.51 MIL/MM3 (ref 4–5.3)
WBC # BLD AUTO: 6.4 TH/MM3 (ref 4–11)

## 2017-10-27 PROCEDURE — 36415 COLL VENOUS BLD VENIPUNCTURE: CPT

## 2017-10-27 PROCEDURE — 85025 COMPLETE CBC W/AUTO DIFF WBC: CPT

## 2017-11-03 ENCOUNTER — HOSPITAL ENCOUNTER (OUTPATIENT)
Dept: HOSPITAL 17 - HSDC | Age: 27
Discharge: HOME | End: 2017-11-03
Payer: MEDICARE

## 2017-11-03 VITALS — BODY MASS INDEX: 47.09 KG/M2 | HEIGHT: 66 IN | WEIGHT: 293 LBS

## 2017-11-03 VITALS
OXYGEN SATURATION: 100 % | DIASTOLIC BLOOD PRESSURE: 56 MMHG | RESPIRATION RATE: 16 BRPM | TEMPERATURE: 98.6 F | SYSTOLIC BLOOD PRESSURE: 93 MMHG | HEART RATE: 97 BPM

## 2017-11-03 DIAGNOSIS — I10: ICD-10-CM

## 2017-11-03 DIAGNOSIS — R00.2: ICD-10-CM

## 2017-11-03 DIAGNOSIS — N31.2: Primary | ICD-10-CM

## 2017-11-03 LAB
APTT BLD: 30.8 SEC (ref 24.3–30.1)
INR PPP: 0.9 RATIO
PROTHROMBIN TIME: 10.4 SEC (ref 9.8–11.6)

## 2017-11-03 PROCEDURE — 85610 PROTHROMBIN TIME: CPT

## 2017-11-03 PROCEDURE — 00300 ANES ALL PX INTEG H/N/PTRUNK: CPT

## 2017-11-03 PROCEDURE — 76000 FLUOROSCOPY <1 HR PHYS/QHP: CPT

## 2017-11-03 PROCEDURE — 72220 X-RAY EXAM SACRUM TAILBONE: CPT

## 2017-11-03 PROCEDURE — C1778 LEAD, NEUROSTIMULATOR: HCPCS

## 2017-11-03 PROCEDURE — 64561 IMPLANT NEUROELECTRODES: CPT

## 2017-11-03 PROCEDURE — 86900 BLOOD TYPING SEROLOGIC ABO: CPT

## 2017-11-03 PROCEDURE — 93005 ELECTROCARDIOGRAM TRACING: CPT

## 2017-11-03 PROCEDURE — 86901 BLOOD TYPING SEROLOGIC RH(D): CPT

## 2017-11-03 PROCEDURE — 86850 RBC ANTIBODY SCREEN: CPT

## 2017-11-03 PROCEDURE — 85730 THROMBOPLASTIN TIME PARTIAL: CPT

## 2017-11-03 NOTE — RADRPT
EXAM DATE/TIME:  11/03/2017 09:43 

 

[facility] COMPARISON:     

No previous studies available for comparison.

 

                     

INDICATIONS :     

Trial temporary interstem therapy.

                     

 

MEDICAL HISTORY :     

None.          

 

SURGICAL HISTORY :     

None.   

 

ENCOUNTER:     

Initial                                        

 

ACUITY:     

1 day      

 

PAIN SCORE:     

Non-responsive.

 

LOCATION:       

[place]

 

FINDINGS:     

Inter stem device is evident along the sacrum along with an IUD.

CONCLUSION:     Inter stem device as described above.  

 

 

 Cade Roe MD FACR on November 03, 2017 at 17:17           

Board Certified Radiologist.

 This report was verified electronically.

## 2017-11-03 NOTE — EKG
Date Performed: 11/03/2017       Time Performed: 07:58:17

 

PTAGE:      27 years

 

EKG:      Sinus rhythm 

 

 LOW QRS VOLTAGE IN PRECORDIAL LEADS NONSPECIFIC T-WAVE ABNORMALITY BORDERLINE ECG 

 

NO PREVIOUS TRACING            

 

DOCTOR:   Niranjan Padgett  Interpretating Date/Time  11/03/2017 12:08:13

## 2017-11-03 NOTE — PD.OP
__________________________________________________





Operative Report


Date of Surgery:  Nov 3, 2017


Preoperative Diagnosis:  


Atonic bladder


Postoperative Diagnosis:  


Same


Procedure:


InterStim percutaneous sacral nerve stimulation test with fluoroscopic guidance 

for needle placement.  Repeat a procedure on the opposite side of the sacral 

nerve.


Anesthesia:


MAC


Surgeon:


Fernando Lay


Assistant(s):


None


Resident Surgeon:


None


Operation and Findings:


27-year-old female with history of atonic neurogenic bladder.  Patient on 

continuous intermittent catheterization 4-5 times per day with volumes in the 

100s to 200 cc range.  Patient elected to undergo a trial of InterStim therapy 

to see if this will help with her atonic neurogenic bladder.  Patient was 

properly identified and placed in the prone position.  Pillows were placed 

under the lower abdomen to flatten the sacrum and under the shins to allow the 

toes to dangle freely.  The ground pad was placed on the bottom of the patient'

s foot and the long test stimulator cable was connected to the ground pad into 

the external test stimulator.  The patient was prepped and draped in the usual 

sterile fashion.  Preprocedure bites were also administered.  The C-arm was 

moved into the AP position to provide fluoroscopic mapping of the sacral region 

which included marking out the midline of the sacrum, SI joints, sciatic notches

, medial foraminal borders and sacral foramen.  Local injection of half percent 

Marcaine was administered in a foramen needle was placed in a 60 angle into 

the S3 foramen.


The C-arm was then put into a lateral position to check depth of the needle 

until identified placement within the proper foramen.  Proper S3 needle 

position was confirmed by patient's sensation of stimulation, direct 

observation of the lifting of the perineum and observation of plantar flexion 

of the great toe.


The foramen needle stylette was removed and a per cutaneously was inserted to 

proper depth using a 5.0-lead marker.  The placement was tested and confirmed 

by connecting the patient cable J-hook to the top of the  and by 

fluoroscopic imaging.  The needle was taken out over the lead.  The above 

procedure was performed again on the opposite side and all appropriate 

responses were again verified.  The leads were connected to the short test 

stimulator cables and ground pads.  Patient was cleaned off and the entire 

region was covered with Tegaderm.  EBL was minimal.


Using external test stimulator the patient was programmed to optimum sensation 

via the lead and given instructions on utilizing the external test stimulator 

prior to discharge.  Urinary diary will be kept until return appointment to the 

office to discuss results of this test stimulation.


CPT codes include 83650 and with modifier 59.











Fernando Lay DO Nov 3, 2017 10:19

## 2017-11-17 ENCOUNTER — HOSPITAL ENCOUNTER (OUTPATIENT)
Dept: HOSPITAL 17 - HSDC | Age: 27
Discharge: HOME | End: 2017-11-17
Payer: MEDICARE

## 2017-11-17 VITALS — WEIGHT: 293 LBS | HEIGHT: 66 IN | BODY MASS INDEX: 47.09 KG/M2

## 2017-11-17 VITALS
SYSTOLIC BLOOD PRESSURE: 121 MMHG | DIASTOLIC BLOOD PRESSURE: 77 MMHG | RESPIRATION RATE: 20 BRPM | TEMPERATURE: 98.1 F | OXYGEN SATURATION: 98 % | HEART RATE: 85 BPM

## 2017-11-17 DIAGNOSIS — N31.2: Primary | ICD-10-CM

## 2017-11-17 DIAGNOSIS — N32.81: ICD-10-CM

## 2017-11-17 PROCEDURE — C1767 GENERATOR, NEURO NON-RECHARG: HCPCS

## 2017-11-17 PROCEDURE — 64581 OPN IMPLTJ NEA SACRAL NERVE: CPT

## 2017-11-17 PROCEDURE — 00630 ANES PX LUMBAR REGION NOS: CPT

## 2017-11-17 PROCEDURE — 72220 X-RAY EXAM SACRUM TAILBONE: CPT

## 2017-11-17 PROCEDURE — C1778 LEAD, NEUROSTIMULATOR: HCPCS

## 2017-11-17 PROCEDURE — C1787 PATIENT PROGR, NEUROSTIM: HCPCS

## 2017-11-17 PROCEDURE — C1713 ANCHOR/SCREW BN/BN,TIS/BN: HCPCS

## 2017-11-17 PROCEDURE — 76000 FLUOROSCOPY <1 HR PHYS/QHP: CPT

## 2017-11-17 NOTE — PD.OP
__________________________________________________





Operative Report


Date of Surgery:  Nov 17, 2017


Preoperative Diagnosis:  


Atonic neurogenic bladder


Postoperative Diagnosis:  


Same


Procedure:


InterStim therapy full system implant


Anesthesia:


MAC


Surgeon:


Fernando Lay


Assistant(s):


None


Resident Surgeon:


None


Operation and Findings:


27-year-old female with a history of an atonic neurogenic bladder.  Patient has 

been performing clean intermittent catheterization and underwent a trial of 

InterStim which helped her with voiding.  Decision was then made to proceed 

with a full InterStim implant.  Risk and benefits assessed preoperative she is 

willing to proceed.


The patient was properly identified and placed in the prone position and MAC 

anesthesia was administered.  Pillows were placed under the lower abdomen to 

flatten the sacrum and under the shins to allow the toes to dangle freely.  The 

patient was prepped and draped in usual sterile fashion.  The C-arm was also 

prepped draped and moved in the AP position to provide fluoroscopic mapping of 

the sacral region which included marking out the midline of the sacrum, SI 

joints, sciatic matches, medial foramen borders and sacral foramen.  The C-arm 

was moved to the lateral position to image the area from the sacral promontory 

to the coccyx.  Local injection using 1/2% Marcaine was administered.





The foramen needle was introduced approximate 2 cm above the sciatic notch and 

2 cm lateral to the sacral midline, feeling for foraminal margins until the S3 

foramen was identified and penetrated.  The depth of the foramen needle was 

confirmed and adjusted fluoroscopically.  Proper needle position was confirmed 

by patient identification of location of sensation, direct observation of the 

lifting of the perineum or bellowing and observation of plantar flexion of the 

great toe utilizing the external test stimulator.





The foramen needle was removed and a directional guide was placed and confirmed 

fluoroscopically.  The foramen needle was removed.  An incision was made 

peripherally to the directional guide to the fascial layer.  The lead 

introducer sheath with dilator was placed over the directional guide and 

directed into the foramen to ensure the radiopaque marker of the lead 

introducer did not extend beyond the anterior edge of the sacrum.  The dilator 

was unlocked and removed along the directional guide.  The lead was then placed 

through the introducer sheath to the first white line.  Position was checked 

fluoroscopically.  The lead was then further reduced until 3 electrodes were 

visible below the sacrum.  Each electrode was tested for location and patient 

sensation, visualization of lia, and plantar flexion of the great toe.  

After satisfactory positioning was confirmed, the introducer sheath was 

retracted under continuous fluoroscopy deploying lead tines into the presacral 

tissue.





Further incision was made in the subcutaneous tissue posterior to the iliac 

crest and lateral to the sacrum.  Blunt dissection was continued into the 

gluteal fascia was identified and hemostasis was achieved allowing for a 

sufficient pocket for the neurostimulator.  The tunneling tool with Strahl was 

placed from the lead exit site subcutaneously to the incised pocket site.  The 

lead was cleansed of bodily fluids and dried.  The lead was inserted into the 

InterStim neurostimulator and the metal bands were aligned with the blue lead 

tip clearly visible in the distal portion of the neurostimulator had her.  The 

single screw set was tightened with the hex wrench.  The neurostimulator was 

placed in the subcutaneous his pocket with the etched identification side 

placed upwards and the excess lead wrapped counterclockwise around the 

neurostimulator.  The programming head was placed over the implanted 

neurostimulator in a sterile cover to ensure adequate lead connected fashion 

and that parameters were within normal limits.  Impedance were confirmed to be 

within normal limits greater than 50 and less than 4000.





The wounds were irrigated with an back solution and water and closed with 4-0 

Monocryl subcuticular sutures.  Catheter correct.  Steri-Strips and gauze 4 x 4'

s were placed over the incision site.  EBL was minimal.  The patient was 

transferred to her room in stable condition.  Using a clinical , the 

patient was programmed to electrode of October and sensation, and given 

instructions on utilizing the patient program prior to discharge.  Complex 

programming of the neurostimulator was performed.  Final electrode selections 

were then set.  CPT codes for this procedure include: 47128, 05523, 25256-70, 

19160.  She will follow-up in the office in 2 weeks for a wound check.











Fernando Lay DO Nov 17, 2017 12:00

## 2017-11-17 NOTE — RADRPT
EXAM DATE/TIME:  11/17/2017 11:38 

 

[facility] COMPARISON:     

[facility], November 03, 2017, 9:43.

 

                     

INDICATIONS :     

Interstim placement.

                     

 

MEDICAL HISTORY :     

None.          

 

SURGICAL HISTORY :     

None.   

 

ENCOUNTER:     

Initial                                        

 

ACUITY:     

1 day      

 

PAIN SCORE:     

Non-responsive.

 

LOCATION:      

pelvis 

 

FINDINGS:     

Two-view examination of the sacrum demonstrate a wire overlying the left sacral area. There is good a
lignment SI joints..

 

CONCLUSION:     

Wire overlying the left sacrum.

 

 

 

 Raul Bojorquez MD on November 17, 2017 at 11:56           

Board Certified Radiologist.

 This report was verified electronically.

## 2017-12-07 ENCOUNTER — HOSPITAL ENCOUNTER (EMERGENCY)
Dept: HOSPITAL 17 - PHED | Age: 27
Discharge: HOME | End: 2017-12-07
Payer: MEDICARE

## 2017-12-07 VITALS
HEART RATE: 107 BPM | TEMPERATURE: 98.6 F | OXYGEN SATURATION: 100 % | SYSTOLIC BLOOD PRESSURE: 137 MMHG | RESPIRATION RATE: 16 BRPM | DIASTOLIC BLOOD PRESSURE: 79 MMHG

## 2017-12-07 VITALS — HEIGHT: 66 IN | WEIGHT: 293 LBS | BODY MASS INDEX: 47.09 KG/M2

## 2017-12-07 VITALS — SYSTOLIC BLOOD PRESSURE: 110 MMHG | DIASTOLIC BLOOD PRESSURE: 70 MMHG

## 2017-12-07 VITALS — HEART RATE: 95 BPM | RESPIRATION RATE: 18 BRPM | OXYGEN SATURATION: 97 %

## 2017-12-07 VITALS
RESPIRATION RATE: 16 BRPM | SYSTOLIC BLOOD PRESSURE: 99 MMHG | OXYGEN SATURATION: 97 % | DIASTOLIC BLOOD PRESSURE: 69 MMHG | HEART RATE: 88 BPM

## 2017-12-07 DIAGNOSIS — N20.0: Primary | ICD-10-CM

## 2017-12-07 DIAGNOSIS — E03.9: ICD-10-CM

## 2017-12-07 LAB
ANION GAP SERPL CALC-SCNC: 7 MEQ/L (ref 5–15)
BACTERIA #/AREA URNS HPF: (no result) /HPF
BASOPHILS # BLD AUTO: 0 TH/MM3 (ref 0–0.2)
BASOPHILS NFR BLD: 0.5 % (ref 0–2)
BUN SERPL-MCNC: 6 MG/DL (ref 7–18)
CHLORIDE SERPL-SCNC: 106 MEQ/L (ref 98–107)
COLOR UR: YELLOW
COMMENT (UR): (no result)
CULTURE IF INDICATED: (no result)
EOSINOPHIL # BLD: 0.1 TH/MM3 (ref 0–0.4)
EOSINOPHIL NFR BLD: 1.8 % (ref 0–4)
ERYTHROCYTE [DISTWIDTH] IN BLOOD BY AUTOMATED COUNT: 12.5 % (ref 11.6–17.2)
GFR SERPLBLD BASED ON 1.73 SQ M-ARVRAT: 81 ML/MIN (ref 89–?)
GLUCOSE UR STRIP-MCNC: (no result) MG/DL
HCO3 BLD-SCNC: 24.8 MEQ/L (ref 21–32)
HCT VFR BLD CALC: 40.2 % (ref 35–46)
HEMO FLAGS: (no result)
HGB UR QL STRIP: (no result)
KETONES UR STRIP-MCNC: (no result) MG/DL
LEUKOCYTE ESTERASE UR QL STRIP: (no result) /HPF (ref 0–5)
LYMPHOCYTES # BLD AUTO: 1.7 TH/MM3 (ref 1–4.8)
LYMPHOCYTES NFR BLD AUTO: 21.4 % (ref 9–44)
MCH RBC QN AUTO: 29.3 PG (ref 27–34)
MCHC RBC AUTO-ENTMCNC: 33.2 % (ref 32–36)
MCV RBC AUTO: 88.3 FL (ref 80–100)
MONOCYTES NFR BLD: 6.4 % (ref 0–8)
NEUTROPHILS # BLD AUTO: 5.6 TH/MM3 (ref 1.8–7.7)
NEUTROPHILS NFR BLD AUTO: 69.9 % (ref 16–70)
NITRITE UR QL STRIP: (no result)
PLATELET # BLD: 191 TH/MM3 (ref 150–450)
POTASSIUM SERPL-SCNC: 3.7 MEQ/L (ref 3.5–5.1)
RBC # BLD AUTO: 4.55 MIL/MM3 (ref 4–5.3)
SODIUM SERPL-SCNC: 138 MEQ/L (ref 136–145)
SP GR UR STRIP: 1.02 (ref 1–1.03)
SQUAMOUS #/AREA URNS HPF: (no result) /HPF (ref 0–5)
WBC # BLD AUTO: 7.9 TH/MM3 (ref 4–11)

## 2017-12-07 PROCEDURE — 74176 CT ABD & PELVIS W/O CONTRAST: CPT

## 2017-12-07 PROCEDURE — 85025 COMPLETE CBC W/AUTO DIFF WBC: CPT

## 2017-12-07 PROCEDURE — 99285 EMERGENCY DEPT VISIT HI MDM: CPT

## 2017-12-07 PROCEDURE — 80048 BASIC METABOLIC PNL TOTAL CA: CPT

## 2017-12-07 PROCEDURE — 81001 URINALYSIS AUTO W/SCOPE: CPT

## 2017-12-07 PROCEDURE — 96361 HYDRATE IV INFUSION ADD-ON: CPT

## 2017-12-07 PROCEDURE — 84703 CHORIONIC GONADOTROPIN ASSAY: CPT

## 2017-12-07 PROCEDURE — 96374 THER/PROPH/DIAG INJ IV PUSH: CPT

## 2017-12-07 NOTE — PD
HPI


Chief Complaint:   Complaint


Time Seen by Provider:  19:12


Travel History


International Travel<30 days:  No


Contact w/Intl Traveler<30days:  No


Traveled to known affect area:  No





History of Present Illness


HPI


27-year-old female presents to the emergency department for planned of dysuria 

flank pain fever or nausea and vomiting.  Symptoms began approximately 6 days 

ago.  Patient has history of recurrent urinary tract infections.  Patient has 

issues with incomplete emptying of her bladder and as recently as November 17 

had an interest in place by her urologist Dr. Lay.  Patient also has prior 

history of stones that typically affect her right kidney as well as measures 

for his kidney on the right.  Patient denies pregnancy.  Patient has IUD in 

place.  Patient reportedly went to urgent care and was started on Cipro for UTI 

however due to nausea and vomiting she had difficulty keeping down the 

medication and followed up with her primary care provider on Tuesday and urine 

specimens collected and sent for resulting in the interim she was given an 

injection of Rocephin and was started on Macrobid.  Patient was followed at 

once a.m. Thursday at the primary care provider's office and redo positional 

daily Rocephin.  Patient is intolerant of Macrobid with ongoing vomiting and 

culture from Tuesday reportedly identified organism sensitive to Cipro 

therefore was re-prescribed Cipro today however vomited again and was unable to 

tolerate further medication so presents now for further evaluation.  Patient 

was given a prescription for Zofran which did not control her nausea and/or 

vomiting.  No report of hematuria.  No reported vaginal discharge or vaginal 

bleeding.  Patient has had intermittent subjective fever.  Patient has not 

contacted her urologist.  Patient also had a negative flu test conducted by her 

primary care provider.





PFSH


Past Medical History


*** Narrative Medical


Celiac disease, TMJ, migraine, prolonged QT syndrome, medullary sponge kidney, 

atonic/neurogenic bladder, thyroid dysfunction, clinical obesity, kidney stones

, anxiety; IUD, appendectomy, cholecystectomy, interested in placement, 

lithotripsy, ureteral stent; no tobacco use no alcohol use no substance use; 

nursing notes reviewed


Autoimmune Disease:  Yes (Celiac, TMJ)


Blood Disorders:  Yes (vonwillebrand disease)


Depression:  Yes


Cancer:  No


Cardiovascular Problems:  Yes (LONG QT INTERVAL)


Diabetes:  No


Diminished Hearing:  No


Endocrine:  No


Gastrointestinal Disorders:  Yes (CELIAC DISEASE)


Genitourinary:  Yes (KIDNEY REFLUX, MULTIPLE STONES; NEUROCYSTIC BLADDER)


Headaches:  Yes (migraines)


Hepatitis:  No


Hiatal Hernia:  No


Hypertension:  Yes


Immune Disorder:  No


Kidney Stones:  Yes


Musculoskeletal:  Yes (LEG TREMORS)


Neurologic:  Yes (MIGRAINES)


Psychiatric:  Yes (ANXIETY)


Reproductive:  No


Respiratory:  No


Immunizations Current:  Yes


Thyroid Disease:  Yes (Hypo-)


Tetanus Vaccination:  > 5 Years


Influenza Vaccination:  No


Pregnant?:  Not Pregnant


LMP:  has IUD





Past Surgical History


Abdominal Surgery:  Yes (CHOLECYSTECTOMY, APPENDECTOMY)


AICD:  No


Appendectomy:  Yes


Body Medical Devices:  IUD


Cardiac Surgery:  No


Cholecystectomy:  Yes


Ear Surgery:  No


Endocrine Surgery:  No


Eye Surgery:  No


Genitourinary Surgery:  Yes (URETHRAL DILATION, LITHOTRIPSY)


Gynecologic Surgery:  No


Joint Replacement:  No


Neurologic Surgery:  Yes (interstim )


Oral Surgery:  Yes ([place] teeth )


Pacemaker:  No


Thoracic Surgery:  No





Social History


Alcohol Use:  No


Tobacco Use:  No


Substance Use:  No





Allergies-Medications


(Allergen,Severity, Reaction):  


Coded Allergies:  


     povidone-iodine (Verified  Allergy, Severe, BLISTER, 12/7/17)


     soap (Verified  Allergy, Severe, BLISTER, 12/7/17)


     Sulfa (Sulfonamide Antibiotics) (Verified  Allergy, Unknown, VOMITING, 12/7 /17)


     hydrocodone (Verified  Allergy, Unknown, HIVES, 12/7/17)


     sumatriptan (Verified  Allergy, Unknown, RESP, 12/7/17)


     zolpidem (Verified  Allergy, Unknown, VOMITING, 12/7/17)


Reported Meds & Prescriptions





Reported Meds & Active Scripts


Active


Allopurinol 300 Mg Tab 300 Mg PO DAILY


Reported


Cipro (Ciprofloxacin HCl) 500 Mg Tab 500 Mg PO BID


Buspirone (Buspirone HCl) 15 Mg Tab 15 Mg PO TID PRN


Zofran (Ondansetron HCl) 4 Mg Tab 4 Mg PO Q12HR PRN


Synthroid (Levothyroxine Sodium) 200 Mcg Tab 200 Mcg PO 1X WK


Synthroid (Levothyroxine Sodium) 300 Mcg Tab 300 Mcg PO 6X WK


B-12 Compliance Inj (Cyanocobalamin) 1,000 Mcg/Ml Kit 1,000 Mcg IM Q30D








Review of Systems


Except as stated in HPI:  all other systems reviewed are Neg


General / Constitutional:  Positive: Fever (subjective), No: Chills


Eyes:  No: Visual changes


HENT:  No: Headaches, Congestion


Cardiovascular:  No: Chest Pain or Discomfort


Respiratory:  No: Shortness of Breath


Gastrointestinal:  Positive: Nausea, Vomiting, Abdominal Pain


Genitourinary:  Positive: Dysuria (left upper quadrant left flank), Flank Pain (

left)


Musculoskeletal:  No: Myalgias, Arthralgias


Skin:  No Rash


Neurologic:  No: Weakness


Psychiatric:  No: Anxiety


Hematologic/Lymphatic:  No: Lymph Node Enlargement





Physical Exam


Narrative


GENERAL: Well-developed well-nourished female in no acute distress no 

respiratory distress


SKIN: Warm and dry.


HEAD: Normocephalic.


EYES: No scleral icterus. No injection or drainage. 


NECK: Supple, trachea midline. No JVD or lymphadenopathy.


CARDIOVASCULAR: Regular rate and rhythm without murmurs, gallops, or rubs. 


RESPIRATORY: Breath sounds equal bilaterally. No accessory muscle use.


GASTROINTESTINAL: Abdomen soft, very mild left upper quadrant tenderness to 

deep palpation without guarding or rebound, nondistended. 


MUSCULOSKELETAL: No cyanosis, or edema. 


BACK: Nontender without obvious deformity.  Left CVA tenderness.














Data


Data


Last Documented VS





Vital Signs








  Date Time  Temp Pulse Resp B/P (MAP) Pulse Ox O2 Delivery O2 Flow Rate FiO2


 


12/7/17 20:25  88 16 99/69 (79) 97 Room Air  


 


12/7/17 18:51 98.6       








Orders





 Orders


Ed Urine Pregnancytest Poc (12/7/17 18:54)


Urinalysis - C+S If Indicated (12/7/17 18:54)


Complete Blood Count With Diff (12/7/17 19:12)


Basic Metabolic Panel (Bmp) (12/7/17 19:12)


Ct Abd/Pel W/O Iv Contrast (12/7/17 19:12)


Ecg Monitoring (12/7/17 19:12)


Iv Access Insert/Monitor (12/7/17 19:12)


Sodium Chloride 0.9% Flush (Ns Flush) (12/7/17 19:15)


Sodium Chlor 0.9% 1000 Ml Inj (Ns 1000 M (12/7/17 19:12)


Metoclopramide Inj (Reglan Inj) (12/7/17 19:15)





Labs





Laboratory Tests








Test


  12/7/17


19:00 12/7/17


19:46


 


Urine Color YELLOW  


 


Urine Turbidity CLEAR  


 


Urine pH 6.0  


 


Urine Specific Gravity 1.016  


 


Urine Protein NEG mg/dL  


 


Urine Glucose (UA) NEG mg/dL  


 


Urine Ketones NEG mg/dL  


 


Urine Occult Blood LARGE  


 


Urine Nitrite NEG  


 


Urine Bilirubin NEG  


 


Urine Leukocyte Esterase NEG  


 


Urine RBC 50-99 /hpf  


 


Urine WBC 3-5 /hpf  


 


Urine Squamous Epithelial


Cells 0-5 /hpf 


  


 


 


Urine Bacteria NONE /hpf  


 


Microscopic Urinalysis Comment


  CULT NOT


INDICATED 


 


 


White Blood Count  7.9 TH/MM3 


 


Red Blood Count  4.55 MIL/MM3 


 


Hemoglobin  13.4 GM/DL 


 


Hematocrit  40.2 % 


 


Mean Corpuscular Volume  88.3 FL 


 


Mean Corpuscular Hemoglobin  29.3 PG 


 


Mean Corpuscular Hemoglobin


Concent 


  33.2 % 


 


 


Red Cell Distribution Width  12.5 % 


 


Platelet Count  191 TH/MM3 


 


Mean Platelet Volume  9.3 FL 


 


Neutrophils (%) (Auto)  69.9 % 


 


Lymphocytes (%) (Auto)  21.4 % 


 


Monocytes (%) (Auto)  6.4 % 


 


Eosinophils (%) (Auto)  1.8 % 


 


Basophils (%) (Auto)  0.5 % 


 


Neutrophils # (Auto)  5.6 TH/MM3 


 


Lymphocytes # (Auto)  1.7 TH/MM3 


 


Monocytes # (Auto)  0.5 TH/MM3 


 


Eosinophils # (Auto)  0.1 TH/MM3 


 


Basophils # (Auto)  0.0 TH/MM3 


 


CBC Comment  DIFF FINAL 


 


Differential Comment   


 


Blood Urea Nitrogen  6 MG/DL 


 


Creatinine  0.83 MG/DL 


 


Random Glucose  95 MG/DL 


 


Calcium Level  8.9 MG/DL 


 


Sodium Level  138 MEQ/L 


 


Potassium Level  3.7 MEQ/L 


 


Chloride Level  106 MEQ/L 


 


Carbon Dioxide Level  24.8 MEQ/L 


 


Anion Gap  7 MEQ/L 


 


Estimat Glomerular Filtration


Rate 


  81 ML/MIN 


 











[facility]


Medical Decision Making


Medical Screen Exam Complete:  Yes


Emergency Medical Condition:  Yes


Medical Record Reviewed:  Yes


Interpretation(s)


POC hcg: negative


CBC & BMP Diagram


12/7/17 19:46








Calcium Level 8.9








Vital Signs








  Date Time  Temp Pulse Resp B/P (MAP) Pulse Ox O2 Delivery O2 Flow Rate FiO2


 


12/7/17 20:25  88 16 99/69 (79) 97 Room Air  


 


12/7/17 19:52  95 18  97 Room Air  


 


12/7/17 18:51 98.6 107 16 137/79 (98) 100   





UA: blood few RBC's





 CT abd/pel: CONCLUSION:     


1. No acute obstructive uropathy. 


2. Multiple calcified nonobstructing right renal calculi. 


 


 


 Bernardo Conde MD on December 07, 2017 at 20:52           


Board Certified Radiologist.


 This report was verified electronically.


Differential Diagnosis


UTI obstructive uropathy ureterolithiasis nephrolithiasis colitis medication 

intolerance dehydration


Narrative Course


 IV access obtained specimens collected and sent for resulting patient 

administered Reglan 10 mg IV as intolerant of Zofran also bolus of normal saline





At 8:37 PM patient notes clinical improvement has had no further vomiting and 

minimal nausea after Reglan so receiving IV fluids; lab values are all found to 

be in normal range point-of-care hCG is negative and patient is waiting to have 

CT performed





CT resulted reveals right-sided nephrolithiasis without obstructive uropathy in 

no stranding or acute inflammatory process also no free air or free fluid 

surgical and patient with spouse at bedside informed of imaging results and 

stable for outpatient management.  Patient is able to tolerate oral hydration 

the emergency department.  Patient given prescription for Reglan.  Patient 

encouraged to follow-up with her primary care provider times one day plea 

course of antibiotic as prescribed by her primary.





Diagnosis





 Primary Impression:  


 Left flank pain


 Additional Impression:  


 Right nephrolithiasis


Referrals:  


Primary Care Physician


1 day


Patient Instructions:  General Instructions





***Additional Instructions:  


complete course of antibiotic as per your PCP/provider


take Reglan as prescribed as needed for nausea and/or vomiting


increase fluid hydration


return to the ED for any concerns


May take as needed as tolerated acetaminophen/Tylenol every 4 hours for fever 

100.4F or greater or for minor pain


May take ibuprofen/Advil/Motrin 600 mg as often as every 6 hours or up to 

maximum 800 mg as often as every 8 hours for fever 100.4F or greater for pain 

associated with inflammation


Follow up with her primary care provider call office in a.m. to schedule follow-

up appointment


***Med/Other Pt SpecificInfo:  Prescription(s) given


Scripts


Metoclopramide (Reglan) 10 Mg Tab


10 MG PO Q6HR Y for NAUSEA OR VOMITING, #10 TAB 0 Refills


   Prov: Zenobia Navarrete MD         12/7/17


Disposition:  01 DISCHARGE HOME


Condition:  Stable











Zenobia Navarrete MD Dec 7, 2017 19:59

## 2017-12-07 NOTE — RADRPT
EXAM DATE/TIME:  12/07/2017 20:11 

 

[facility] COMPARISON:     

CT ABDOMEN & PELVIS W/O CONTRAST, April 29, 2017, 22:42.

 

 

INDICATIONS :     

Left flank pain for 6 days. UTI

                  

 

ORAL CONTRAST:      

No oral contrast ingested.

                  

 

RADIATION DOSE:     

27.95 CTDIvol (mGy) ; Patient body habitus

 

 

MEDICAL HISTORY :     

Renal calculi. Hypertension. 

 

SURGICAL HISTORY :      

Appendectomy. Cholecystectomy.

 

ENCOUNTER:      

Initial

 

ACUITY:      

4 - 6 days

 

PAIN SCALE:      

6/10

 

LOCATION:       

Left flank 

 

TECHNIQUE:     

Volumetric scanning of the abdomen and pelvis was performed.  Using automated exposure control and ad
justment of the mA and/or kV according to patient size, radiation dose was kept as low as reasonably 
achievable to obtain optimal diagnostic quality images.  DICOM format image data is available electro
nically for review and comparison.  

 

FINDINGS:     

 

LOWER LUNGS:     

The visualized lower lungs are clear.

 

LIVER:     

Homogeneous density without lesion.  There is no dilation of the biliary tree.  No calcified gallston
es. Status post cholecystectomy.

 

SPLEEN:     

Normal size without lesion.

 

PANCREAS:     

Within normal limits. 

 

KIDNEYS:     

Normal in size and shape.  Multiple calcified nonobstructing right renal calculi are noted with the l
argest measuring 5 mm in the lower pole. There is no mass or hydronephrosis.

 

ADRENAL GLANDS:     

Within normal limits.

 

VASCULAR:     

There is no aortic aneurysm.

 

BOWEL/MESENTERY:     

The stomach, small bowel, and colon demonstrate no acute abnormality.  There is no free intraperitone
al air or fluid. 

 

ABDOMINAL WALL:     

Within normal limits.

 

RETROPERITONEUM:     

There is no lymphadenopathy.

 

BLADDER:     

No wall thickening or mass.

 

REPRODUCTIVE:     

An IUD is noted within the endometrial cavity.

 

INGUINAL:     

There is no lymphadenopathy or hernia.

 

MUSCULOSKELETAL:     

Within normal limits for patient age.

 

CONCLUSION:     

1. No acute obstructive uropathy. 

2. Multiple calcified nonobstructing right renal calculi. 

 

 

 Bernardo Conde MD on December 07, 2017 at 20:52           

Board Certified Radiologist.

 This report was verified electronically.

## 2017-12-09 ENCOUNTER — HOSPITAL ENCOUNTER (EMERGENCY)
Dept: HOSPITAL 17 - PHEFT | Age: 27
Discharge: HOME | End: 2017-12-09
Payer: MEDICARE

## 2017-12-09 VITALS
DIASTOLIC BLOOD PRESSURE: 72 MMHG | SYSTOLIC BLOOD PRESSURE: 152 MMHG | HEART RATE: 90 BPM | OXYGEN SATURATION: 99 % | RESPIRATION RATE: 16 BRPM | TEMPERATURE: 98.5 F

## 2017-12-09 VITALS — BODY MASS INDEX: 47.09 KG/M2 | HEIGHT: 66 IN | WEIGHT: 293 LBS

## 2017-12-09 DIAGNOSIS — Z86.79: ICD-10-CM

## 2017-12-09 DIAGNOSIS — Z87.448: ICD-10-CM

## 2017-12-09 DIAGNOSIS — Z87.19: ICD-10-CM

## 2017-12-09 DIAGNOSIS — Z86.69: ICD-10-CM

## 2017-12-09 DIAGNOSIS — E07.9: ICD-10-CM

## 2017-12-09 DIAGNOSIS — Z86.2: ICD-10-CM

## 2017-12-09 DIAGNOSIS — F41.8: ICD-10-CM

## 2017-12-09 DIAGNOSIS — Z87.39: ICD-10-CM

## 2017-12-09 DIAGNOSIS — M25.511: Primary | ICD-10-CM

## 2017-12-09 DIAGNOSIS — I10: ICD-10-CM

## 2017-12-09 PROCEDURE — 99283 EMERGENCY DEPT VISIT LOW MDM: CPT

## 2017-12-09 PROCEDURE — 73030 X-RAY EXAM OF SHOULDER: CPT

## 2017-12-09 NOTE — PD
HPI


Chief Complaint:  Injury


Time Seen by Provider:  17:23


Travel History


International Travel<30 days:  No


Contact w/Intl Traveler<30days:  No


Traveled to known affect area:  No





History of Present Illness


HPI


27 -year-old female here with right shoulder pain and decreased range of motion 

3 days.  She cannot recall specific injury.  She has pain with range of motion 

which is relieved with rest.  She denies paresthesia or weakness of the 

extremity.  Symptom severity is moderate.  Slightly relieved with 

immobilization.





PFSH


Past Medical History


Autoimmune Disease:  Yes (Celiac, TMJ)


Blood Disorders:  Yes (vonwillebrand disease)


Depression:  Yes


Cancer:  No


Cardiovascular Problems:  Yes (LONG QT INTERVAL)


Diabetes:  No


Diminished Hearing:  No


Endocrine:  No


Gastrointestinal Disorders:  Yes (CELIAC DISEASE)


Genitourinary:  Yes (KIDNEY REFLUX, MULTIPLE STONES; NEUROCYSTIC BLADDER)


Headaches:  Yes (migraines)


Hepatitis:  No


Hiatal Hernia:  No


Hypertension:  Yes


Immune Disorder:  No


Kidney Stones:  Yes


Musculoskeletal:  Yes (LEG TREMORS)


Neurologic:  Yes (MIGRAINES)


Psychiatric:  Yes (ANXIETY)


Reproductive:  No


Respiratory:  No


Immunizations Current:  Yes


Thyroid Disease:  Yes (Hypo-)


Pregnant?:  Not Pregnant


LMP:  has IUD





Past Surgical History


Abdominal Surgery:  Yes (CHOLECYSTECTOMY, APPENDECTOMY)


AICD:  No


Appendectomy:  Yes


Body Medical Devices:  IUD


Cardiac Surgery:  No


Cholecystectomy:  Yes


Ear Surgery:  No


Endocrine Surgery:  No


Eye Surgery:  No


Genitourinary Surgery:  Yes (URETHRAL DILATION, LITHOTRIPSY)


Gynecologic Surgery:  No


Joint Replacement:  No


Neurologic Surgery:  Yes (interstim )


Oral Surgery:  Yes (Edison teeth )


Pacemaker:  No


Thoracic Surgery:  No


Other Surgery:  Yes





Social History


Alcohol Use:  No


Tobacco Use:  No


Substance Use:  No





Allergies-Medications


(Allergen,Severity, Reaction):  


Coded Allergies:  


     povidone-iodine (Verified  Allergy, Severe, BLISTER, 12/9/17)


     soap (Verified  Allergy, Severe, BLISTER, 12/9/17)


     Sulfa (Sulfonamide Antibiotics) (Verified  Allergy, Unknown, VOMITING, 12/9 /17)


     hydrocodone (Verified  Allergy, Unknown, HIVES, 12/9/17)


     sumatriptan (Verified  Allergy, Unknown, RESP, 12/9/17)


     zolpidem (Verified  Allergy, Unknown, VOMITING, 12/9/17)


Reported Meds & Prescriptions





Reported Meds & Active Scripts


Active


Robaxin (Methocarbamol) 500 Mg Tab 500 Mg PO TID


Allopurinol 300 Mg Tab 300 Mg PO DAILY


Reported


Buspirone (Buspirone HCl) 15 Mg Tab 15 Mg PO TID PRN


Synthroid (Levothyroxine Sodium) 300 Mcg Tab 300 Mcg PO 6X WK


B-12 Compliance Inj (Cyanocobalamin) 1,000 Mcg/Ml Kit 1,000 Mcg IM Q30D








Review of Systems


Except as stated in HPI:  all other systems reviewed are Neg





Physical Exam


Narrative


GENERAL: Alert female in no distress


SKIN: Warm and dry.


HEAD: Normocephalic.


EYES: No scleral icterus. No injection or drainage. 


NECK: Supple, trachea midline. No JVD or lymphadenopathy.


CARDIOVASCULAR: Regular rate and rhythm without murmurs, gallops, or rubs. 


RESPIRATORY: Breath sounds equal bilaterally. No accessory muscle use.


GASTROINTESTINAL: Abdomen soft, non-tender, nondistended. 


MUSCULOSKELETAL: No cyanosis, or edema.  Right upper extremity: Tenderness to 

the anterior posterior aspect of the shoulder.  No deformity.  2+ distal 

pulses.  Normal sensation.  Brisk cap refill.








Data


Data


Last Documented VS





Vital Signs








  Date Time  Temp Pulse Resp B/P (MAP) Pulse Ox O2 Delivery O2 Flow Rate FiO2


 


12/9/17 16:58      Room Air  


 


12/9/17 16:53 98.5 90 16 152/72 (98) 99   








Orders





 Orders


Shoulder, Limited(2vws) (12/9/17 )


Splint Or Brace Apply/Monitor (12/9/17 18:57)








MDM


Medical Decision Making


Medical Screen Exam Complete:  Yes


Emergency Medical Condition:  Yes


Differential Diagnosis


Strain/sprain, fracture, dislocation


Narrative Course


27-year-old female here with nontraumatic right shoulder pain 2 days.  Patient 

reports increasing pain and decreased range of motion.  Her physical exam is 

reassuring.  Extremities neurovascular intact.  There is no deformity.  She has 

tenderness of the right trapezius muscle and soft tissue of the shoulder.  X-

ray negative for fracture.  Patient will be put in a sling.  Given short dose 

of muscle relaxers and instructed to follow-up with her primary doctor..





Diagnosis





 Primary Impression:  


 Shoulder pain


 Qualified Codes:  M25.511 - Pain in right shoulder


Referrals:  


Orthopedist





Primary Care Physician


Scripts


Methocarbamol (Robaxin) 500 Mg Tab


500 MG PO TID for Muscle Spasm, #15 TAB 0 Refills


   Prov: Kamala Rivas         12/9/17


Disposition:  01 DISCHARGE HOME


Condition:  Stable











Kamala Rivas Dec 9, 2017 17:26

## 2017-12-09 NOTE — RADRPT
EXAM DATE/TIME:  12/09/2017 18:11 

 

[facility] COMPARISON:     

No previous studies available for comparison.

 

                     

INDICATIONS :     

Right shoulder pain, no known injury, has increasing pain and limited ROM

                     

 

MEDICAL HISTORY :     

None.          

 

SURGICAL HISTORY :     

None.   

 

ENCOUNTER:     

Initial                                        

 

ACUITY:     

2 days      

 

PAIN SCORE:     

7/10

 

LOCATION:     

Right  Shoulder

 

FINDINGS:     

Two view examination of the right shoulder demonstrates no evidence of fracture or dislocation.  The 
glenohumeral and acromioclavicular joints are maintained.  Bony mineralization is normal.

 

CONCLUSION:     

Unremarkable limited examination of the right shoulder.  

 

 

 

 Lisandro Shepard MD on December 09, 2017 at 18:44           

Board Certified Radiologist.

 This report was verified electronically.

## 2018-04-19 ENCOUNTER — HOSPITAL ENCOUNTER (OUTPATIENT)
Dept: HOSPITAL 17 - PHSDC | Age: 28
Setting detail: OBSERVATION
LOS: 1 days | Discharge: HOME | End: 2018-04-20
Attending: ORTHOPAEDIC SURGERY | Admitting: ORTHOPAEDIC SURGERY
Payer: MEDICARE

## 2018-04-19 VITALS
RESPIRATION RATE: 20 BRPM | HEART RATE: 89 BPM | DIASTOLIC BLOOD PRESSURE: 77 MMHG | TEMPERATURE: 97 F | SYSTOLIC BLOOD PRESSURE: 179 MMHG | OXYGEN SATURATION: 96 %

## 2018-04-19 VITALS — HEIGHT: 66 IN | WEIGHT: 293 LBS | BODY MASS INDEX: 47.09 KG/M2

## 2018-04-19 VITALS — HEART RATE: 110 BPM

## 2018-04-19 VITALS — HEART RATE: 113 BPM

## 2018-04-19 VITALS — HEART RATE: 109 BPM

## 2018-04-19 DIAGNOSIS — E06.3: ICD-10-CM

## 2018-04-19 DIAGNOSIS — K90.0: ICD-10-CM

## 2018-04-19 DIAGNOSIS — D68.0: ICD-10-CM

## 2018-04-19 DIAGNOSIS — Z79.899: ICD-10-CM

## 2018-04-19 DIAGNOSIS — M24.411: Primary | ICD-10-CM

## 2018-04-19 PROCEDURE — 85018 HEMOGLOBIN: CPT

## 2018-04-19 PROCEDURE — 85025 COMPLETE CBC W/AUTO DIFF WBC: CPT

## 2018-04-19 PROCEDURE — 96360 HYDRATION IV INFUSION INIT: CPT

## 2018-04-19 PROCEDURE — 85014 HEMATOCRIT: CPT

## 2018-04-19 PROCEDURE — G0378 HOSPITAL OBSERVATION PER HR: HCPCS

## 2018-04-19 PROCEDURE — 23455 REPAIR SHOULDER CAPSULE: CPT

## 2018-04-19 PROCEDURE — 96372 THER/PROPH/DIAG INJ SC/IM: CPT

## 2018-04-19 PROCEDURE — 01630 ANES OPN/ARTHR PX SHO JT NOS: CPT

## 2018-04-19 PROCEDURE — 80048 BASIC METABOLIC PNL TOTAL CA: CPT

## 2018-04-19 RX ADMIN — CEFAZOLIN SODIUM SCH MLS/HR: 1 POWDER, FOR SOLUTION INTRAMUSCULAR; INTRAVENOUS at 20:53

## 2018-04-19 RX ADMIN — MORPHINE SULFATE PRN MG: 2 INJECTION, SOLUTION INTRAMUSCULAR; INTRAVENOUS at 23:27

## 2018-04-20 VITALS
RESPIRATION RATE: 14 BRPM | HEART RATE: 88 BPM | DIASTOLIC BLOOD PRESSURE: 55 MMHG | SYSTOLIC BLOOD PRESSURE: 111 MMHG | TEMPERATURE: 97.8 F | OXYGEN SATURATION: 94 %

## 2018-04-20 VITALS
SYSTOLIC BLOOD PRESSURE: 124 MMHG | HEART RATE: 102 BPM | OXYGEN SATURATION: 94 % | TEMPERATURE: 97.5 F | RESPIRATION RATE: 22 BRPM | DIASTOLIC BLOOD PRESSURE: 78 MMHG

## 2018-04-20 VITALS
RESPIRATION RATE: 18 BRPM | OXYGEN SATURATION: 96 % | HEART RATE: 80 BPM | SYSTOLIC BLOOD PRESSURE: 126 MMHG | DIASTOLIC BLOOD PRESSURE: 75 MMHG | TEMPERATURE: 97.2 F

## 2018-04-20 LAB
BASOPHILS # BLD AUTO: 0 TH/MM3 (ref 0–0.2)
BASOPHILS NFR BLD: 0.1 % (ref 0–2)
BUN SERPL-MCNC: 11 MG/DL (ref 7–18)
CALCIUM SERPL-MCNC: 8.5 MG/DL (ref 8.5–10.1)
CHLORIDE SERPL-SCNC: 104 MEQ/L (ref 98–107)
CREAT SERPL-MCNC: 0.7 MG/DL (ref 0.5–1)
EOSINOPHIL # BLD: 0 TH/MM3 (ref 0–0.4)
EOSINOPHIL NFR BLD: 0 % (ref 0–4)
ERYTHROCYTE [DISTWIDTH] IN BLOOD BY AUTOMATED COUNT: 14 % (ref 11.6–17.2)
GFR SERPLBLD BASED ON 1.73 SQ M-ARVRAT: 99 ML/MIN (ref 89–?)
GLUCOSE SERPL-MCNC: 132 MG/DL (ref 74–106)
HCO3 BLD-SCNC: 23.3 MEQ/L (ref 21–32)
HCT VFR BLD CALC: 34.5 % (ref 35–46)
HCT VFR BLD CALC: 35.8 % (ref 35–46)
HGB BLD-MCNC: 11.6 GM/DL (ref 11.6–15.3)
HGB BLD-MCNC: 11.6 GM/DL (ref 11.6–15.3)
LYMPHOCYTES # BLD AUTO: 1 TH/MM3 (ref 1–4.8)
LYMPHOCYTES NFR BLD AUTO: 6.5 % (ref 9–44)
MCH RBC QN AUTO: 28.3 PG (ref 27–34)
MCHC RBC AUTO-ENTMCNC: 32.3 % (ref 32–36)
MCV RBC AUTO: 87.5 FL (ref 80–100)
MONOCYTE #: 0.5 TH/MM3 (ref 0–0.9)
MONOCYTES NFR BLD: 3.2 % (ref 0–8)
NEUTROPHILS # BLD AUTO: 13.9 TH/MM3 (ref 1.8–7.7)
NEUTROPHILS NFR BLD AUTO: 90.2 % (ref 16–70)
PLATELET # BLD: 236 TH/MM3 (ref 150–450)
PMV BLD AUTO: 9.7 FL (ref 7–11)
RBC # BLD AUTO: 4.09 MIL/MM3 (ref 4–5.3)
SODIUM SERPL-SCNC: 136 MEQ/L (ref 136–145)
WBC # BLD AUTO: 15.4 TH/MM3 (ref 4–11)

## 2018-04-20 RX ADMIN — OXYCODONE HYDROCHLORIDE AND ACETAMINOPHEN PRN TAB: 5; 325 TABLET ORAL at 15:00

## 2018-04-20 RX ADMIN — OXYCODONE HYDROCHLORIDE AND ACETAMINOPHEN PRN TAB: 5; 325 TABLET ORAL at 10:28

## 2018-04-20 RX ADMIN — CEFAZOLIN SODIUM SCH MLS/HR: 1 POWDER, FOR SOLUTION INTRAMUSCULAR; INTRAVENOUS at 05:46

## 2018-04-20 RX ADMIN — MORPHINE SULFATE PRN MG: 2 INJECTION, SOLUTION INTRAMUSCULAR; INTRAVENOUS at 05:48

## 2018-04-20 NOTE — MP
cc:

Ezequiel Bond MD

****

 

 

DATE OF OPERATION:

04/19/2018

 

LOCATION:

Surgery is at [facility].

 

PREOPERATIVE DIAGNOSIS:

Right shoulder recurrent subluxation/dislocation/anterior.

 

POSTOPERATIVE DIAGNOSIS:

Right shoulder recurrent subluxation/dislocation/anterior.

 

PROCEDURE PERFORMED:

Right shoulder Bankart procedure.

 

PROCEDURE PERFORMED IN DETAIL:

Informed consent was obtained.  The patient was taken to the operating

room and placed in the supine position on the operative table.  She 

was administered a general anesthesia by Dr. Horan of the anesthesia 

department.  The patient was placed in a beach chair position.  She 

had received appropriate preoperative treatment by anesthesia for von 

Willebrand disease.  The right shoulder had a sterile U drape and was 

prepped with alcohol, then hexachlorophene.  Draping commenced with 

sterile towels about the shoulder.  Split sheets stockinette was 

applied over the hand and forearm.  This was wrapped with Coban.  A 

sterile drape was cut and placed about the shoulder to drape off the 

field.  An Ioban drape was not utilized ultimately for this purpose. 

At that time, a timeout was held and confirmed the patient was given a

gram of Ancef prior to the operative procedure.  A marking pen had 

been utilized preoperatively to estimate the anterior skin incision.  

At that time, the anterior axillary fold was identified and the 

anterior shoulder incision in the axillary fold was made.  Skin and 

subcutaneous tissues were divided and bleeding was managed utilizing a

Bovie.  The cephalic vein and deltopectoral interval was identified.  

This was opened and the cephalic vein was retracted with the deltoid 

muscle.  This exposed the clavipectoral fascia.  The edge of the 

conjoined tendon was identified.  The muscular fibers adjacent to the 

tendon were identified and retracted medially.  Deltoid was retracted 

laterally to expose the subscapularis muscle.  The undersurface of the

subscapularis with the 2 vessels and nerve were identified.  A 

dissection was carried down through the subscapularis tendon and these

vessels were protected.  The subscapularis was tagged at the inferior 

and superior regions and allowed to retract.  This exposed the joint 

capsule, which was opened in a T fashion.  Superior and inferior limbs

were tagged.  The humeral head did appear perched on the anterior rim 

of the glenoid.  The Fukuda retractor was placed and a pickle fork was

placed on the inner portion of the glenoid through the glenoid neck.  

At that time, utilizing the Bankart instruments, which included the 

scaphoid gouge, a curved spike and hand _____, 3 holes were made in 

the usual Bankart fashion.  A #5 Lamb needle had two #2 Ti-Cron 

sutures placed and 2 sutures were placed through each of the 3 holes 

in the glenoid and, at that time, the inferior hole which was at 

approximately the 5 o'clock position was brought through the inferior 

limb of the capsule.  The middle hole was attached to both the 

inferior and superior limbs of the capsule and the superior was placed

through the superior part of the capsule.  At that time, the arm was 

placed in a position of reduction of the glenohumeral joint and 

approximately 5 degrees external rotation, as all of the sutures were 

tied down.  Excess suture material was removed.  One limb from the 

superior anchor was brought over to attach to the middle hole and 

likewise wound from the inferior was also attached to the middle hole.

 The stability appeared excellent.  At that time, the subscapularis 

tendon was allowed to fall back to its normal position and was 

re-anchored utilizing 0 Vicryl stitches.  The deltopectoral interval 

was then closed with 0 Vicryl in the subcutaneous tissue and the deep 

plane closed with 2-0 Vicryl in the superficial portion with 3-0 plain

sutures, 4-0 nylon was utilized in the skin utilizing the _____ type 

stitch.  Steri-Strips, 4 x 4's, ABD and tape was applied.  The patient

was placed in a sling.  She tolerated the procedure well, was then 

taken to the recovery room in stable condition.  At the completion of 

the procedure, sponge counts, instrument counts and needle counts were

correct.

 

ESTIMATED BLOOD LOSS:

300 mL.

 

 

__________________________________

MD FIFI Vera/ALFONSO

D: 04/19/2018, 05:47 PM

T: 04/19/2018, 06:36 PM

Visit #: [id number]

Job #: [id number]

## 2018-04-20 NOTE — PD.CONS
HPI


Service


[facility]


Consult Requested By


Dr. Chew.


Reason for Consult


Medical management


Primary Care Physician


Panfilo Sheridan MD


Diagnoses:  


(1) Right shoulder injury


History of Present Illness


This is a 27-year-old female patient with a known medical history of von 

Willebrand's, Hashimoto's thyroiditis who underwent a right shoulder Bankart 

procedure by orthopedic surgeon today, hospitalist team has been consulted for 

medical management.  Supposedly last year patient had injured her right 

shoulder and underwent several repairs as well as medical management and 

eventually required the said procedure today.  Patient follows with medical 

hematologist Dr. Coello for her von Willebrand management.  She does admit to a 

history of celiac disease.  She also has a extensive history for an unspecified 

neuromuscular disease and is being seen up in [place].  Her Hashimoto's 

thyroiditis is controlled.  Pain controlled with morphine and Percocet.  Denies 

any recent illness including fever, chills, cough, shortness breath, abdominal 

pain, nausea, vomiting, diarrhea dysuria.





Review of Systems


Constitutional:  DENIES: Fever, Chills


Eyes:  DENIES: Blurred vision, Diplopia


Respiratory:  DENIES: Cough, Shortness of breath


Cardiovascular:  DENIES: Chest pain, Palpitations


Gastrointestinal:  DENIES: Abdominal pain, Black stools, Bloody stools, 

Constipation, Diarrhea, Nausea, Vomiting


Musculoskeletal:  COMPLAINS OF: Joint pain (Right shoulder)


Except as stated in HPI:  all other systems reviewed are Neg





Past Family Social History


Allergies:  


Coded Allergies:  


     gluten (Verified  Allergy, Severe, 4/20/18)


     povidone-iodine (Verified  Allergy, Severe, BLISTER, 12/9/17)


     soap (Verified  Allergy, Severe, BLISTER, 12/9/17)


     Sulfa (Sulfonamide Antibiotics) (Verified  Allergy, Unknown, VOMITING, 12/9 /17)


     hydrocodone (Verified  Allergy, Unknown, HIVES, 12/9/17)


     sumatriptan (Verified  Allergy, Unknown, RESP, 12/9/17)


     zolpidem (Verified  Allergy, Unknown, VOMITING, 12/9/17)


Past Medical History


Hashimoto's thyroiditis


Lymphoma Willebrand's


Celiac disease


Past Surgical History


Cholecystectomy


Unspecified leg biopsy


Appendectomy


Multiple right shoulder repairs


Orthopedic surgery as a child unspecified


Reported Medications


Active


Reported


Synthroid (Levothyroxine Sodium) 200 Mcg Tab 200 Mcg PO BIA


Vitamin D-1000 (Cholecalciferol) 1,000 Unit Tab 1,000 Units PO DAILY


Flomax (Tamsulosin HCl) 0.4 Mg Cap 0.4 Mg PO HS


Synthroid (Levothyroxine Sodium) 300 Mcg Tab 300 Mcg PO 6X WK


B-12 Compliance Inj (Cyanocobalamin) 1,000 Mcg/Ml Kit 1,000 Mcg IM Q30D


Active Ordered Medications





Current Medications








 Medications


  (Trade)  Dose


 Ordered  Sig/Abisai


 Route  Start Time


 Stop Time Status Last Admin


 


 Lactated Ringer's  1,000 ml @ 


 30 mls/hr  Q24H PRN


 IV  4/19/18 11:45


 4/22/18 11:44  4/19/18 11:40


 


 


 Sodium Chloride  500 ml @ 


 30 mls/hr  [id number] PRN


 IV  4/19/18 11:45


 4/22/18 11:44   


 


 


  (Lopressor)  25 mg  ON CALL  PRN


 PO  4/19/18 11:45


 4/22/18 11:44   


 


 


  (Chlorhexidine


 2% Cloth)  3 pack  ON CALL  PRN


 TOPICAL  4/19/18 11:45


 4/22/18 11:44  4/19/18 11:20


 


 


 Dextrose/Sodium


 Chloride  1,000 ml @ 


 70 mls/hr  [id number]


 IV  4/19/18 18:00


    4/19/18 18:23


 


 


  (Keflex)  500 mg  BID


 PO  4/20/18 21:00


     


 


 


  (Vitamin B12 Inj)  1,000 mcg  Q30D


 IM  4/19/18 20:00


    4/19/18 20:53


 


 


  (Flomax)  0.4 mg  HS


 PO  4/19/18 21:00


    4/19/18 20:53


 


 


  (Vitamin D3)  1,000 units  DAILY


 PO  4/21/18 09:00


     


 


 


  (Synthroid)  200 mcg  Montesinos@0600


 PO  4/22/18 06:00


     


 


 


  (Morphine Inj)  2 mg  Q3H  PRN


 IV PUSH  4/20/18 09:45


     


 


 


  (Percocet  5-325


 Mg)  1 tab  Q4H  PRN


 PO  4/20/18 09:45


     


 


 


  (Percocet  5-325


 Mg)  2 tab  Q4H  PRN


 PO  4/20/18 09:45


    4/20/18 15:00


 


 


  (Synthroid)  300 mcg  MoTuWeThFrSa@0600


 PO  4/21/18 06:00


     


 








Family History


Maternal medical history significant for hypertension and SVT.  Paternal 

medical history significant for hypothyroidism and bladder cancer.


Social History


Denies any tobacco, alcohol or illicit drug use.





Physical Exam


Vital Signs





Vital Signs








  Date Time  Temp Pulse Resp B/P (MAP) Pulse Ox O2 Delivery O2 Flow Rate FiO2


 


4/20/18 07:30 97.8 88 14 111/55 (73) 94   


 


4/20/18 00:00 97.5 102 22 124/78 (93) 94   


 


4/19/18 20:00 97.0 89 20 179/77 (111) 96   


 


4/19/18 17:45  105 14 108/67 (81) 96 Nasal Cannula 3 


 


4/19/18 17:30  107 14 97/62 (74) 97 Nasal Cannula 3 


 


4/19/18 17:19   14     


 


4/19/18 17:15  108 14 119/66 (83) 95 Nasal Cannula 3 


 


4/19/18 17:04  110      


 


4/19/18 17:04 98.6 110 14 110/63 (79) 92 Nasal Cannula 3 


 


4/19/18 13:00  109      


 


4/19/18 13:00  109 18 137/78 (97) 100   


 


4/19/18 12:45     100 Nasal Cannula 2 


 


4/19/18 12:40  113      


 


4/19/18 11:36 98.3 89 18 154/100 (118) 99   








Physical Exam


GENERAL: Well-developed, well-nourished patient in NAD.  Obese.


SKIN: Warm and dry. No rash.


HEAD:  Normocephalic. Atraumatic.


EYES: Pupils equal and round. No scleral icterus. No injection or drainage. 


ENT: No nasal bleeding or discharge.  Mucous membranes pink and moist.


NECK: Supple. Trachea midline.  


CARDIOVASCULAR: Regular rate and rhythm.  S1, S2 noted. No murmur appreciated. 


RESPIRATORY: No accessory muscle use. Clear to auscultation. Breath sounds 

equal bilaterally.  


GASTROINTESTINAL: Abdomen soft, non-tender, nondistended. Normoactive bowel 

sounds x4.


MUSCULOSKELETAL: No obvious deformities. Extremities without clubbing, cyanosis

, or edema.  Right shoulder in sling with dressing in place.


NEUROLOGICAL: Awake and alert. No obvious cranial nerve deficits.  Motor 

grossly within normal limits. 5/5 muscle strength in bilateral upper and lower 

extremities.  Normal speech.


PSYCHIATRIC: Appropriate mood and affect; insight and judgment normal.


Laboratory





Laboratory Tests








Test


  4/20/18


05:50


 


Hemoglobin 11.6 


 


Hematocrit 34.5 








Result Diagram:  


4/20/18 0550








Assessment and Plan


Problem List:  


(1) Right shoulder injury


ICD Code:  S49.91XA - Unspecified injury of right shoulder and upper arm, 

initial encounter


Assessment and Plan


Status post right shoulder Bankart procedure by orthopedic surgery.


-Postop day #0.  Pain control with morphine IV and Percocet.


- Continue sling and dressing changes per orthopedic recommendations.





History of hypothyroidism/Hashimoto's thyroiditis: Continue home medications.





Patient is stable.  Medical history stable.  No further recommendations or 

input.  Will sign off.  Please reconsult if needed.





Thank you for this consultation.











Viv Banks Apr 20, 2018 09:41

## 2018-05-22 ENCOUNTER — HOSPITAL ENCOUNTER (EMERGENCY)
Dept: HOSPITAL 17 - PHED | Age: 28
Discharge: HOME | End: 2018-05-22
Payer: MEDICARE

## 2018-05-22 VITALS
TEMPERATURE: 98.4 F | HEART RATE: 93 BPM | DIASTOLIC BLOOD PRESSURE: 86 MMHG | OXYGEN SATURATION: 97 % | RESPIRATION RATE: 18 BRPM | SYSTOLIC BLOOD PRESSURE: 150 MMHG

## 2018-05-22 VITALS — WEIGHT: 293 LBS | BODY MASS INDEX: 47.09 KG/M2 | HEIGHT: 66 IN

## 2018-05-22 VITALS — DIASTOLIC BLOOD PRESSURE: 85 MMHG | SYSTOLIC BLOOD PRESSURE: 145 MMHG | TEMPERATURE: 98.6 F

## 2018-05-22 VITALS — OXYGEN SATURATION: 98 %

## 2018-05-22 DIAGNOSIS — D68.0: ICD-10-CM

## 2018-05-22 DIAGNOSIS — I10: ICD-10-CM

## 2018-05-22 DIAGNOSIS — N20.0: Primary | ICD-10-CM

## 2018-05-22 DIAGNOSIS — F41.9: ICD-10-CM

## 2018-05-22 DIAGNOSIS — K90.0: ICD-10-CM

## 2018-05-22 LAB
BACTERIA #/AREA URNS HPF: (no result) /HPF
BASOPHILS # BLD AUTO: 0 TH/MM3 (ref 0–0.2)
BASOPHILS NFR BLD: 0.5 % (ref 0–2)
BUN SERPL-MCNC: 7 MG/DL (ref 7–18)
CALCIUM SERPL-MCNC: 9.2 MG/DL (ref 8.5–10.1)
CHLORIDE SERPL-SCNC: 107 MEQ/L (ref 98–107)
COLOR UR: (no result)
CREAT SERPL-MCNC: 0.84 MG/DL (ref 0.5–1)
EOSINOPHIL # BLD: 0.2 TH/MM3 (ref 0–0.4)
EOSINOPHIL NFR BLD: 2.3 % (ref 0–4)
ERYTHROCYTE [DISTWIDTH] IN BLOOD BY AUTOMATED COUNT: 13.5 % (ref 11.6–17.2)
GFR SERPLBLD BASED ON 1.73 SQ M-ARVRAT: 80 ML/MIN (ref 89–?)
GLUCOSE SERPL-MCNC: 102 MG/DL (ref 74–106)
GLUCOSE UR STRIP-MCNC: (no result) MG/DL
HCO3 BLD-SCNC: 26.5 MEQ/L (ref 21–32)
HCT VFR BLD CALC: 37.7 % (ref 35–46)
HGB BLD-MCNC: 12.5 GM/DL (ref 11.6–15.3)
HGB UR QL STRIP: (no result)
KETONES UR STRIP-MCNC: (no result) MG/DL
LEUKOCYTE ESTERASE UR QL STRIP: (no result) /HPF (ref 0–5)
LYMPHOCYTES # BLD AUTO: 1.7 TH/MM3 (ref 1–4.8)
LYMPHOCYTES NFR BLD AUTO: 22.1 % (ref 9–44)
MCH RBC QN AUTO: 28.8 PG (ref 27–34)
MCHC RBC AUTO-ENTMCNC: 33.2 % (ref 32–36)
MCV RBC AUTO: 86.8 FL (ref 80–100)
MONOCYTE #: 0.3 TH/MM3 (ref 0–0.9)
MONOCYTES NFR BLD: 4.3 % (ref 0–8)
NEUTROPHILS # BLD AUTO: 5.5 TH/MM3 (ref 1.8–7.7)
NEUTROPHILS NFR BLD AUTO: 70.8 % (ref 16–70)
NITRITE UR QL STRIP: (no result)
PLATELET # BLD: 204 TH/MM3 (ref 150–450)
PMV BLD AUTO: 8.6 FL (ref 7–11)
RBC # BLD AUTO: 4.35 MIL/MM3 (ref 4–5.3)
RBC #/AREA URNS HPF: (no result) /HPF (ref 0–3)
SODIUM SERPL-SCNC: 139 MEQ/L (ref 136–145)
SP GR UR STRIP: 1.01 (ref 1–1.03)
SQUAMOUS #/AREA URNS HPF: (no result) /HPF (ref 0–5)
URINE LEUKOCYTE ESTERASE: (no result)
WBC # BLD AUTO: 7.7 TH/MM3 (ref 4–11)

## 2018-05-22 PROCEDURE — 96361 HYDRATE IV INFUSION ADD-ON: CPT

## 2018-05-22 PROCEDURE — 96376 TX/PRO/DX INJ SAME DRUG ADON: CPT

## 2018-05-22 PROCEDURE — 99284 EMERGENCY DEPT VISIT MOD MDM: CPT

## 2018-05-22 PROCEDURE — 96374 THER/PROPH/DIAG INJ IV PUSH: CPT

## 2018-05-22 PROCEDURE — 96375 TX/PRO/DX INJ NEW DRUG ADDON: CPT

## 2018-05-22 PROCEDURE — 84703 CHORIONIC GONADOTROPIN ASSAY: CPT

## 2018-05-22 PROCEDURE — 81001 URINALYSIS AUTO W/SCOPE: CPT

## 2018-05-22 PROCEDURE — 85025 COMPLETE CBC W/AUTO DIFF WBC: CPT

## 2018-05-22 PROCEDURE — 80048 BASIC METABOLIC PNL TOTAL CA: CPT

## 2018-05-22 NOTE — PD
HPI


Chief Complaint:  Flank/Kidney Pain


Time Seen by Provider:  01:12


Travel History


International Travel<30 days:  No


Contact w/Intl Traveler<30days:  No


Traveled to known affect area:  No





History of Present Illness


HPI


The patient is a 28-year-old  female who presents to the emergency 

department for right flank pain.  The patient states she has passed 3 kidney 

stones over the last 3 weeks.  Her primary physician sent her to have a an 

outpatient CT performed this morning that was performed at radiology [facility] 

imaging.  The patient does not know the results.  However, she was awakened 

from her sleep with right flank pain that radiates to the right groin.  She 

also notes hematuria, nausea, vomiting, and significant pain.  The patient does 

have a history of similar symptoms in the past secondary to passing kidney 

stones.  The patient denies pregnancy, states she has an IUD in place and has 

not had a menstrual cycle in several months.  She denies any left lower 

quadrant abdominal pain or significant vaginal discharge or dysuria.  Symptoms 

are moderate.





PFSH


Past Medical History


Autoimmune Disease:  Yes (Celiac, TMJ)


Blood Disorders:  Yes (vonwillebrand disease)


Anxiety:  Yes


Depression:  No


Cancer:  No


Cardiovascular Problems:  Yes (HX OF LONG QT INTERVAL)


Diabetes:  No


Diminished Hearing:  No


Endocrine:  No


Gastrointestinal Disorders:  Yes (CELIAC DISEASE)


Genitourinary:  Yes (KIDNEY REFLUX, MULTIPLE STONES; NEUROGENIC BLADDER)


Headaches:  Yes (migraines)


Hepatitis:  No


Hiatal Hernia:  No


Hypertension:  Yes


Immune Disorder:  No


Kidney Stones:  Yes


Musculoskeletal:  Yes (LEG TREMORS)


Neurologic:  Yes (MIGRAINES)


Psychiatric:  Yes (ANXIETY)


Reproductive:  No


Respiratory:  No


Immunizations Current:  Yes


Thyroid Disease:  Yes (HYPO)





Past Surgical History


Abdominal Surgery:  Yes (CHOLECYSTECTOMY, APPENDECTOMY)


AICD:  No


Appendectomy:  Yes


Body Medical Devices:  IUD, NEUROSTIMULATOR BLADDER


Cardiac Surgery:  No


Cholecystectomy:  Yes


Ear Surgery:  No


Endocrine Surgery:  No


Eye Surgery:  No


Genitourinary Surgery:  Yes (URETHRAL DILATION, LITHOTRIPSY;BLADDER 

NEUROSTIMULATOR)


Gynecologic Surgery:  No


Joint Replacement:  No


Neurologic Surgery:  Yes (interstim )


Oral Surgery:  Yes ([place] teeth )


Pacemaker:  No


Thoracic Surgery:  No


Other Surgery:  Yes





Social History


Alcohol Use:  No


Tobacco Use:  No


Substance Use:  No





Allergies-Medications


(Allergen,Severity, Reaction):  


Coded Allergies:  


     gluten (Verified  Allergy, Severe, 4/20/18)


     povidone-iodine (Verified  Allergy, Severe, BLISTER, 12/9/17)


     soap (Verified  Allergy, Severe, BLISTER, 12/9/17)


     Sulfa (Sulfonamide Antibiotics) (Verified  Allergy, Unknown, VOMITING, 12/9 /17)


     hydrocodone (Verified  Allergy, Unknown, HIVES, 12/9/17)


     sumatriptan (Verified  Allergy, Unknown, RESP, 12/9/17)


     zolpidem (Verified  Allergy, Unknown, VOMITING, 12/9/17)


Reported Meds & Prescriptions





Reported Meds & Active Scripts


Active


Flomax (Tamsulosin HCl) 0.4 Mg Cap 0.4 Mg PO HS


Percocet (Oxycodone-Acetaminophen)  mg Tab 1 Tab PO Q6H PRN


Reported


Synthroid (Levothyroxine Sodium) 200 Mcg Tab 200 Mcg PO SUNDAY


Vitamin D-1000 (Cholecalciferol) 1,000 Unit Tab 1,000 Units PO DAILY


Flomax (Tamsulosin HCl) 0.4 Mg Cap 0.4 Mg PO HS


Synthroid (Levothyroxine Sodium) 300 Mcg Tab 300 Mcg PO 6X WK


B-12 Compliance Inj (Cyanocobalamin) 1,000 Mcg/Ml Kit 1,000 Mcg IM Q30D








Review of Systems


Except as stated in HPI:  all other systems reviewed are Neg


General / Constitutional:  No: Fever


Cardiovascular:  No: Chest Pain or Discomfort


Respiratory:  No: Shortness of Breath


Gastrointestinal:  Positive: Nausea, Vomiting, Abdominal Pain, No: Diarrhea


Genitourinary:  Positive: Hematuria, Flank Pain, No: Dysuria


Hematologic/Lymphatic:  Positive: Other (History of von Willebrand's disease)





Physical Exam


Narrative


GENERAL: Awake, alert, pleasant 28-year-old female who appears her stated age 

and appears to be in moderate discomfort.


SKIN: Focused skin assessment warm/dry.


HEAD: Atraumatic. Normocephalic. 


EYES: No injection or drainage.


ENT: No nasal bleeding or discharge.  Mucous membranes pink and moist.


NECK: Trachea midline. No JVD. 


CARDIOVASCULAR: Regular rate and rhythm.  No murmur appreciated.


RESPIRATORY: No accessory muscle use. Clear to auscultation. Breath sounds 

equal bilaterally. 


GASTROINTESTINAL: Abdomen soft, obese, no rebound tenderness.  Negative Perea'

s.


Back: Mild right CVA tenderness.


MUSCULOSKELETAL: No obvious deformities. No clubbing.  No cyanosis.  No edema. 


NEUROLOGICAL: Awake and alert. No obvious cranial nerve deficits.  Motor 

grossly within normal limits. Normal speech.


PSYCHIATRIC: Appropriate mood and affect; insight and judgment normal.





Data


Data


Last Documented VS





Vital Signs








  Date Time  Temp Pulse Resp B/P (MAP) Pulse Ox O2 Delivery O2 Flow Rate FiO2


 


5/22/18 01:46     98 Room Air  


 


5/22/18 01:07 98.4 93 18     








Orders





 Orders


Basic Metabolic Panel (Bmp) (5/22/18 01:20)


Complete Blood Count With Diff (5/22/18 01:20)


Urinalysis - C+S If Indicated (5/22/18 01:20)


Iv Access Insert/Monitor (5/22/18 01:20)


Ecg Monitoring (5/22/18 01:20)


Oximetry (5/22/18 01:20)


Morphine Inj (Morphine Inj) (5/22/18 01:30)


Sodium Chlor 0.9% 1000 Ml Inj (Ns 1000 M (5/22/18 01:20)


Sodium Chloride 0.9% Flush (Ns Flush) (5/22/18 01:30)


Ketorolac Inj (Toradol Inj) (5/22/18 01:30)


Ed Urine Pregnancytest Poc (5/22/18 01:20)


Ondansetron  Odt (Zofran  Odt) (5/22/18 01:30)





Labs





Laboratory Tests








Test


  5/22/18


01:40


 


White Blood Count 7.7 TH/MM3 


 


Red Blood Count 4.35 MIL/MM3 


 


Hemoglobin 12.5 GM/DL 


 


Hematocrit 37.7 % 


 


Mean Corpuscular Volume 86.8 FL 


 


Mean Corpuscular Hemoglobin 28.8 PG 


 


Mean Corpuscular Hemoglobin


Concent 33.2 % 


 


 


Red Cell Distribution Width 13.5 % 


 


Platelet Count 204 TH/MM3 


 


Mean Platelet Volume 8.6 FL 


 


Neutrophils (%) (Auto) 70.8 % 


 


Lymphocytes (%) (Auto) 22.1 % 


 


Monocytes (%) (Auto) 4.3 % 


 


Eosinophils (%) (Auto) 2.3 % 


 


Basophils (%) (Auto) 0.5 % 


 


Neutrophils # (Auto) 5.5 TH/MM3 


 


Lymphocytes # (Auto) 1.7 TH/MM3 


 


Monocytes # (Auto) 0.3 TH/MM3 


 


Eosinophils # (Auto) 0.2 TH/MM3 


 


Basophils # (Auto) 0.0 TH/MM3 


 


CBC Comment DIFF FINAL 


 


Differential Comment  


 


Urine Collection Type VOIDED 


 


Urine Color PINK 


 


Urine Turbidity CLOUDY 


 


Urine pH 7.5 


 


Urine Specific Gravity 1.010 


 


Urine Protein NEG mg/dL 


 


Urine Glucose (UA) NEG mg/dL 


 


Urine Ketones NEG mg/dL 


 


Urine Occult Blood LARGE 


 


Urine Nitrite NEG 


 


Urine Bilirubin NEG 


 


Urine Urobilinogen 0.2 MG/DL 


 


Urine Leukocyte Esterase TRACE 


 


Urine -200 /hpf 


 


Urine WBC 6-8 /hpf 


 


Urine Squamous Epithelial


Cells 6-8 /hpf 


 


 


Urine Bacteria NONE /hpf 


 


Microscopic Urinalysis Comment


  CULT NOT


INDICATED


 


Urine Collection Time 0140 


 


Blood Urea Nitrogen 7 MG/DL 


 


Creatinine 0.84 MG/DL 


 


Random Glucose 102 MG/DL 


 


Calcium Level 9.2 MG/DL 


 


Sodium Level 139 MEQ/L 


 


Potassium Level 3.8 MEQ/L 


 


Chloride Level 107 MEQ/L 


 


Carbon Dioxide Level 26.5 MEQ/L 


 


Anion Gap 6 MEQ/L 


 


Estimat Glomerular Filtration


Rate 80 ML/MIN 


 











MDM


Medical Decision Making


Medical Screen Exam Complete:  Yes


Emergency Medical Condition:  Yes


Medical Record Reviewed:  Yes


Interpretation(s)


CT of the abdomen and pelvis that was performed this morning at radiology 

[facility] reveals multiple small nonobstructing right 

renal stones measuring up to 4 mm.








Laboratory Tests








Test


  5/22/18


01:40


 


White Blood Count 7.7 TH/MM3 


 


Red Blood Count 4.35 MIL/MM3 


 


Hemoglobin 12.5 GM/DL 


 


Hematocrit 37.7 % 


 


Mean Corpuscular Volume 86.8 FL 


 


Mean Corpuscular Hemoglobin 28.8 PG 


 


Mean Corpuscular Hemoglobin


Concent 33.2 % 


 


 


Red Cell Distribution Width 13.5 % 


 


Platelet Count 204 TH/MM3 


 


Mean Platelet Volume 8.6 FL 


 


Neutrophils (%) (Auto) 70.8 % 


 


Lymphocytes (%) (Auto) 22.1 % 


 


Monocytes (%) (Auto) 4.3 % 


 


Eosinophils (%) (Auto) 2.3 % 


 


Basophils (%) (Auto) 0.5 % 


 


Neutrophils # (Auto) 5.5 TH/MM3 


 


Lymphocytes # (Auto) 1.7 TH/MM3 


 


Monocytes # (Auto) 0.3 TH/MM3 


 


Eosinophils # (Auto) 0.2 TH/MM3 


 


Basophils # (Auto) 0.0 TH/MM3 


 


CBC Comment DIFF FINAL 


 


Differential Comment  


 


Urine Collection Type VOIDED 


 


Urine Color PINK 


 


Urine Turbidity CLOUDY 


 


Urine pH 7.5 


 


Urine Specific Gravity 1.010 


 


Urine Protein NEG mg/dL 


 


Urine Glucose (UA) NEG mg/dL 


 


Urine Ketones NEG mg/dL 


 


Urine Occult Blood LARGE 


 


Urine Nitrite NEG 


 


Urine Bilirubin NEG 


 


Urine Urobilinogen 0.2 MG/DL 


 


Urine Leukocyte Esterase TRACE 


 


Urine -200 /hpf 


 


Urine WBC 6-8 /hpf 


 


Urine Squamous Epithelial


Cells 6-8 /hpf 


 


 


Urine Bacteria NONE /hpf 


 


Microscopic Urinalysis Comment


  CULT NOT


INDICATED


 


Urine Collection Time 0140 


 


Blood Urea Nitrogen 7 MG/DL 


 


Creatinine 0.84 MG/DL 


 


Random Glucose 102 MG/DL 


 


Calcium Level 9.2 MG/DL 


 


Sodium Level 139 MEQ/L 


 


Potassium Level 3.8 MEQ/L 


 


Chloride Level 107 MEQ/L 


 


Carbon Dioxide Level 26.5 MEQ/L 


 


Anion Gap 6 MEQ/L 


 


Estimat Glomerular Filtration


Rate 80 ML/MIN 


 








Differential Diagnosis


Differential diagnosis includes nephrolithiasis, pyelonephritis, atypical 

appendicitis, ectopic pregnancy, ovarian cyst, ovarian torsion.


Narrative Course


IV was established, labs are drawn and sent, and the patient was placed on 

cardiac telemetry monitoring and continuous pulse oximetry monitoring.  The 

patient was administered Toradol, morphine, Zofran, and IV fluids.  The patient 

appears to be having right flank pain, most likely secondary to the passage of 

a kidney stone.  The patient just had a CT this morning which revealed multiple 

small nonobstructing renal stones.  Therefore, I will not re-CT the patient I 

will treat her for probable ureterolithiasis.  Bedside UA pregnancy test was 

obtained.  UA was sent to lab.  The patient's CBC is unremarkable.  BMP is 

unremarkable, creatinine is within normal limits.  UA does reveal RBCs but only 

few WBCs, most likely secondary to blood.  It does appear the patient has 

hematuria with flank pain, most likely passed a kidney stone.  The patient be 

discharged home on Flomax and Percocet.  She is advised to follow-up with her 

primary physician.





Diagnosis





 Primary Impression:  


 Nephrolithiasis


Patient Instructions:  General Instructions





***Additional Instructions:  


Medications as directed.  Follow-up with your primary physician.  Return if 

symptoms worsen or progress.  Please provide the patient a copy of her labs at 

discharge.


***Med/Other Pt SpecificInfo:  Prescription(s) given


Scripts


Tamsulosin (Flomax) 0.4 Mg Cap


0.4 MG PO HS for Manage Prostate Problems, #7 CAP 0 Refills


   Prov: Tommy Dunbar MD         5/22/18 


Oxycodone-Acetaminophen (Percocet)  mg Tab


1 TAB PO Q6H Y for PAIN, #12 TAB 0 Refills


   Prov: Tommy Dunbar MD         5/22/18


Disposition:  01 DISCHARGE HOME


Condition:  Stable











Tommy Dunbar MD May 22, 2018 01:31